# Patient Record
Sex: FEMALE | Race: WHITE | NOT HISPANIC OR LATINO | Employment: PART TIME | URBAN - METROPOLITAN AREA
[De-identification: names, ages, dates, MRNs, and addresses within clinical notes are randomized per-mention and may not be internally consistent; named-entity substitution may affect disease eponyms.]

---

## 2022-02-22 ENCOUNTER — HISTORICAL (OUTPATIENT)
Dept: ADMINISTRATIVE | Facility: HOSPITAL | Age: 27
End: 2022-02-22

## 2022-05-26 ENCOUNTER — HOSPITAL ENCOUNTER (EMERGENCY)
Facility: HOSPITAL | Age: 27
Discharge: HOME | End: 2022-05-26
Attending: STUDENT IN AN ORGANIZED HEALTH CARE EDUCATION/TRAINING PROGRAM
Payer: MEDICAID

## 2022-05-26 VITALS
HEART RATE: 96 BPM | RESPIRATION RATE: 18 BRPM | SYSTOLIC BLOOD PRESSURE: 131 MMHG | DIASTOLIC BLOOD PRESSURE: 72 MMHG | TEMPERATURE: 99 F | OXYGEN SATURATION: 100 %

## 2022-05-26 DIAGNOSIS — R55 VASOVAGAL SYNCOPE: ICD-10-CM

## 2022-05-26 DIAGNOSIS — N39.0 URINARY TRACT INFECTION WITHOUT HEMATURIA, SITE UNSPECIFIED: ICD-10-CM

## 2022-05-26 DIAGNOSIS — R55 SYNCOPE: ICD-10-CM

## 2022-05-26 DIAGNOSIS — R11.2 NAUSEA AND VOMITING, INTRACTABILITY OF VOMITING NOT SPECIFIED, UNSPECIFIED VOMITING TYPE: Primary | ICD-10-CM

## 2022-05-26 LAB
ALBUMIN SERPL-MCNC: 3.8 GM/DL (ref 3.5–5)
ALBUMIN/GLOB SERPL: 1 RATIO (ref 1.1–2)
ALP SERPL-CCNC: 88 UNIT/L (ref 40–150)
ALT SERPL-CCNC: 38 UNIT/L (ref 0–55)
APPEARANCE UR: ABNORMAL
AST SERPL-CCNC: 27 UNIT/L (ref 5–34)
B-HCG SERPL QL: NEGATIVE
BACTERIA #/AREA URNS AUTO: ABNORMAL /HPF
BASOPHILS # BLD AUTO: 0.03 X10(3)/MCL (ref 0–0.2)
BASOPHILS NFR BLD AUTO: 0.2 %
BILIRUB UR QL STRIP.AUTO: NEGATIVE MG/DL
BILIRUBIN DIRECT+TOT PNL SERPL-MCNC: 1 MG/DL
BNP BLD-MCNC: <10 PG/ML
BUN SERPL-MCNC: 9.4 MG/DL (ref 7–18.7)
CALCIUM SERPL-MCNC: 9.6 MG/DL (ref 8.4–10.2)
CHLORIDE SERPL-SCNC: 102 MMOL/L (ref 98–107)
CO2 SERPL-SCNC: 24 MMOL/L (ref 22–29)
COLOR UR AUTO: ABNORMAL
CREAT SERPL-MCNC: 0.71 MG/DL (ref 0.55–1.02)
EOSINOPHIL # BLD AUTO: 0.14 X10(3)/MCL (ref 0–0.9)
EOSINOPHIL NFR BLD AUTO: 1.1 %
ERYTHROCYTE [DISTWIDTH] IN BLOOD BY AUTOMATED COUNT: 13.2 % (ref 11.5–17)
FLUAV AG UPPER RESP QL IA.RAPID: NOT DETECTED
FLUBV AG UPPER RESP QL IA.RAPID: NOT DETECTED
GLOBULIN SER-MCNC: 4 GM/DL (ref 2.4–3.5)
GLUCOSE SERPL-MCNC: 103 MG/DL (ref 74–100)
GLUCOSE UR QL STRIP.AUTO: NEGATIVE MG/DL
HCT VFR BLD AUTO: 38.2 % (ref 37–47)
HGB BLD-MCNC: 12.3 GM/DL (ref 12–16)
IMM GRANULOCYTES # BLD AUTO: 0.05 X10(3)/MCL (ref 0–0.02)
IMM GRANULOCYTES NFR BLD AUTO: 0.4 % (ref 0–0.43)
KETONES UR QL STRIP.AUTO: ABNORMAL MG/DL
LEUKOCYTE ESTERASE UR QL STRIP.AUTO: ABNORMAL UNIT/L
LIPASE SERPL-CCNC: 11 U/L
LYMPHOCYTES # BLD AUTO: 2.01 X10(3)/MCL (ref 0.6–4.6)
LYMPHOCYTES NFR BLD AUTO: 15.8 %
MCH RBC QN AUTO: 27 PG (ref 27–31)
MCHC RBC AUTO-ENTMCNC: 32.2 MG/DL (ref 33–36)
MCV RBC AUTO: 84 FL (ref 80–94)
MONOCYTES # BLD AUTO: 0.93 X10(3)/MCL (ref 0.1–1.3)
MONOCYTES NFR BLD AUTO: 7.3 %
NEUTROPHILS # BLD AUTO: 9.5 X10(3)/MCL (ref 2.1–9.2)
NEUTROPHILS NFR BLD AUTO: 75.2 %
NITRITE UR QL STRIP.AUTO: NEGATIVE
NRBC BLD AUTO-RTO: 0 %
PH UR STRIP.AUTO: 5.5 [PH]
PLATELET # BLD AUTO: 408 X10(3)/MCL (ref 130–400)
PMV BLD AUTO: 9.4 FL (ref 9.4–12.4)
POTASSIUM SERPL-SCNC: 5 MMOL/L (ref 3.5–5.1)
PROT SERPL-MCNC: 7.8 GM/DL (ref 6.4–8.3)
PROT UR QL STRIP.AUTO: ABNORMAL MG/DL
RBC # BLD AUTO: 4.55 X10(6)/MCL (ref 4.2–5.4)
RBC #/AREA URNS AUTO: 28.1 /HPF
RBC UR QL AUTO: ABNORMAL UNIT/L
SARS-COV-2 RNA RESP QL NAA+PROBE: NOT DETECTED
SODIUM SERPL-SCNC: 138 MMOL/L (ref 136–145)
SP GR UR STRIP.AUTO: 1.02 (ref 1–1.03)
SQUAMOUS #/AREA URNS AUTO: 6 /LPF
STREP A PCR (OHS): NOT DETECTED
TROPONIN I SERPL-MCNC: <0.01 NG/ML (ref 0–0.04)
UROBILINOGEN UR STRIP-ACNC: 1 MG/DL
WBC # SPEC AUTO: 12.7 X10(3)/MCL (ref 4.5–11.5)
WBC #/AREA URNS AUTO: 311 /HPF

## 2022-05-26 PROCEDURE — 85025 COMPLETE CBC W/AUTO DIFF WBC: CPT | Performed by: STUDENT IN AN ORGANIZED HEALTH CARE EDUCATION/TRAINING PROGRAM

## 2022-05-26 PROCEDURE — 93005 ELECTROCARDIOGRAM TRACING: CPT

## 2022-05-26 PROCEDURE — 84484 ASSAY OF TROPONIN QUANT: CPT | Performed by: STUDENT IN AN ORGANIZED HEALTH CARE EDUCATION/TRAINING PROGRAM

## 2022-05-26 PROCEDURE — 96375 TX/PRO/DX INJ NEW DRUG ADDON: CPT

## 2022-05-26 PROCEDURE — 96374 THER/PROPH/DIAG INJ IV PUSH: CPT

## 2022-05-26 PROCEDURE — 99285 EMERGENCY DEPT VISIT HI MDM: CPT | Mod: 25

## 2022-05-26 PROCEDURE — 25000003 PHARM REV CODE 250: Performed by: STUDENT IN AN ORGANIZED HEALTH CARE EDUCATION/TRAINING PROGRAM

## 2022-05-26 PROCEDURE — 81001 URINALYSIS AUTO W/SCOPE: CPT | Performed by: STUDENT IN AN ORGANIZED HEALTH CARE EDUCATION/TRAINING PROGRAM

## 2022-05-26 PROCEDURE — 83880 ASSAY OF NATRIURETIC PEPTIDE: CPT | Performed by: STUDENT IN AN ORGANIZED HEALTH CARE EDUCATION/TRAINING PROGRAM

## 2022-05-26 PROCEDURE — 36415 COLL VENOUS BLD VENIPUNCTURE: CPT | Performed by: STUDENT IN AN ORGANIZED HEALTH CARE EDUCATION/TRAINING PROGRAM

## 2022-05-26 PROCEDURE — 83690 ASSAY OF LIPASE: CPT | Performed by: STUDENT IN AN ORGANIZED HEALTH CARE EDUCATION/TRAINING PROGRAM

## 2022-05-26 PROCEDURE — 81025 URINE PREGNANCY TEST: CPT | Performed by: STUDENT IN AN ORGANIZED HEALTH CARE EDUCATION/TRAINING PROGRAM

## 2022-05-26 PROCEDURE — 63600175 PHARM REV CODE 636 W HCPCS: Performed by: STUDENT IN AN ORGANIZED HEALTH CARE EDUCATION/TRAINING PROGRAM

## 2022-05-26 PROCEDURE — 80053 COMPREHEN METABOLIC PANEL: CPT | Performed by: STUDENT IN AN ORGANIZED HEALTH CARE EDUCATION/TRAINING PROGRAM

## 2022-05-26 PROCEDURE — 87631 RESP VIRUS 3-5 TARGETS: CPT | Performed by: STUDENT IN AN ORGANIZED HEALTH CARE EDUCATION/TRAINING PROGRAM

## 2022-05-26 PROCEDURE — 87636 SARSCOV2 & INF A&B AMP PRB: CPT | Performed by: STUDENT IN AN ORGANIZED HEALTH CARE EDUCATION/TRAINING PROGRAM

## 2022-05-26 RX ORDER — BUTALBITAL, ACETAMINOPHEN AND CAFFEINE 50; 325; 40 MG/1; MG/1; MG/1
1 TABLET ORAL EVERY 6 HOURS PRN
Qty: 15 TABLET | Refills: 0 | Status: SHIPPED | OUTPATIENT
Start: 2022-05-26 | End: 2022-05-31

## 2022-05-26 RX ORDER — KETOROLAC TROMETHAMINE 30 MG/ML
30 INJECTION, SOLUTION INTRAMUSCULAR; INTRAVENOUS
Status: COMPLETED | OUTPATIENT
Start: 2022-05-26 | End: 2022-05-26

## 2022-05-26 RX ORDER — ONDANSETRON 2 MG/ML
4 INJECTION INTRAMUSCULAR; INTRAVENOUS
Status: COMPLETED | OUTPATIENT
Start: 2022-05-26 | End: 2022-05-26

## 2022-05-26 RX ORDER — IBUPROFEN 400 MG/1
400 TABLET ORAL EVERY 6 HOURS PRN
Qty: 15 TABLET | Refills: 0 | Status: SHIPPED | OUTPATIENT
Start: 2022-05-26 | End: 2022-05-31

## 2022-05-26 RX ORDER — CEPHALEXIN 500 MG/1
500 CAPSULE ORAL EVERY 12 HOURS
Qty: 10 CAPSULE | Refills: 0 | Status: SHIPPED | OUTPATIENT
Start: 2022-05-26 | End: 2022-05-31

## 2022-05-26 RX ORDER — ACETAMINOPHEN 500 MG
1000 TABLET ORAL
Status: COMPLETED | OUTPATIENT
Start: 2022-05-26 | End: 2022-05-26

## 2022-05-26 RX ORDER — ONDANSETRON 4 MG/1
4 TABLET, ORALLY DISINTEGRATING ORAL EVERY 8 HOURS PRN
Qty: 15 TABLET | Refills: 0 | Status: SHIPPED | OUTPATIENT
Start: 2022-05-26 | End: 2022-05-31

## 2022-05-26 RX ADMIN — ONDANSETRON 4 MG: 2 INJECTION INTRAMUSCULAR; INTRAVENOUS at 08:05

## 2022-05-26 RX ADMIN — ACETAMINOPHEN 1000 MG: 500 TABLET ORAL at 10:05

## 2022-05-26 RX ADMIN — KETOROLAC TROMETHAMINE 30 MG: 30 INJECTION, SOLUTION INTRAMUSCULAR; INTRAVENOUS at 10:05

## 2022-05-26 RX ADMIN — SODIUM CHLORIDE, POTASSIUM CHLORIDE, SODIUM LACTATE AND CALCIUM CHLORIDE 1000 ML: 600; 310; 30; 20 INJECTION, SOLUTION INTRAVENOUS at 08:05

## 2022-05-26 NOTE — DISCHARGE INSTRUCTIONS
Follow-up with primary care provider.    You may take Zofran dissolvable tablet as needed for nausea or vomiting.    Return to the emergency room if you have any worsening pain, fever, difficulty breathing, headache, or any new symptoms.

## 2022-05-26 NOTE — ED PROVIDER NOTES
Encounter Date: 5/26/2022    SCRIBE #1 NOTE: I, Michelle Perkins, am scribing for, and in the presence of,  Bertrand Cortes MD. I have scribed the following portions of the note - Other sections scribed: HPI, ROS, PE.       History     Chief Complaint   Patient presents with    Sore Throat     Onset Monday. Went to [facility] and was dx with dehydration. Reports fall this AM, hits head, +LOC, not on BT. PMH collapsed vein to left leg    Dizziness    Fall     26 y/o female with hx of anxiety and depression presents to the ED complaining of a sore throat for the past 3 days. Associated symptoms include nausea and vomiting. The pt states she woke up this morning at 0100 choking on her vomit and reports she passed out in her bathroom. She states she hit the back of her head on the sink when she lost consciousness. Denies any sick contacts. The pt notes she has a stent in her left leg and has a hx of cholecystectomy.    The history is provided by the patient.   Sore Throat   This is a new problem. The sore throat symptoms include sore throat.Illness onset: 3 days ago. The problem has been gradually improving. Associated symptoms include vomiting. Pertinent negatives include no abdominal pain or shortness of breath.   Dizziness  Pertinent negatives include no chest pain, no abdominal pain and no shortness of breath.   Fall  Associated symptoms include nausea and vomiting. Pertinent negatives include no fever and no abdominal pain.     Review of patient's allergies indicates:  No Known Allergies  History reviewed. No pertinent past medical history.  History reviewed. No pertinent surgical history.  History reviewed. No pertinent family history.     Review of Systems   Constitutional: Negative for fever.   HENT: Positive for sore throat.    Eyes: Negative for visual disturbance.   Respiratory: Negative for shortness of breath.    Cardiovascular: Negative for chest pain.   Gastrointestinal: Positive for nausea and vomiting. Negative  for abdominal pain.   Genitourinary: Negative for dysuria.   Musculoskeletal: Negative for joint swelling.   Skin: Negative for rash.   Neurological: Positive for dizziness. Negative for weakness.        Loss of consciousness   Psychiatric/Behavioral: Negative for confusion.   All other systems reviewed and are negative.      Physical Exam     Initial Vitals [05/26/22 0759]   BP Pulse Resp Temp SpO2   123/78 99 18 99.3 °F (37.4 °C) 97 %      MAP       --         Physical Exam    Nursing note and vitals reviewed.  Constitutional: She appears well-developed and well-nourished.   HENT:   Head: Normocephalic and atraumatic.   Right Ear: External ear normal.   Left Ear: External ear normal.   Nose: Nose normal.   Mouth/Throat: Oropharynx is clear and moist. No oropharyngeal exudate.   No abrasions, contusions, lacerations to the scalp or face.  No superior inferior orbital ridge tenderness to palpation.  No zygomatic arch tenderness to palpation.  No epistaxis.  No CSF rhinorrhea.  No septal hematoma.  No intraoral injuries noted.  Normal external ear.  No raccoon eyes.  No Wright sign.       Eyes: EOM are normal. Pupils are equal, round, and reactive to light.   Neck:   Normal range of motion.  Cardiovascular: Normal rate, regular rhythm, normal heart sounds and intact distal pulses.   No murmur heard.  Pulmonary/Chest: Breath sounds normal. No respiratory distress. She has no wheezes. She has no rales.   Abdominal: Abdomen is soft. She exhibits no distension. There is no abdominal tenderness. There is no rebound.   Musculoskeletal:         General: No tenderness or edema. Normal range of motion.      Cervical back: Normal range of motion.      Comments: No C,T or L-spine vertebral point tenderness to palpation, no step-offs, no deformities.  Right upper extremity:  Full range of motion of shoulder, elbow, wrist, no deformity or tenderness to palpation.  Left upper extremity: Full range of motion of shoulder, elbow,  wrist, no deformity or tenderness to palpation.  Right lower extremity:  Full range of motion of hip, knee, ankle, no tenderness palpation or deformity noted.  Left lower extremity:  Full range of motion of hip, knee, ankle, no tenderness palpation or deformity noted.       Neurological: She is alert. She has normal strength. No cranial nerve deficit. GCS score is 15. GCS eye subscore is 4. GCS verbal subscore is 5. GCS motor subscore is 6.   Skin: Skin is warm and dry. Capillary refill takes less than 2 seconds. No rash noted. No erythema.   No abrasions, contusions or lacerations to head or face   Psychiatric: She has a normal mood and affect.         ED Course   Procedures  Labs Reviewed   COMPREHENSIVE METABOLIC PANEL - Abnormal; Notable for the following components:       Result Value    Glucose Level 103 (*)     Globulin 4.0 (*)     Albumin/Globulin Ratio 1.0 (*)     All other components within normal limits   URINALYSIS, REFLEX TO URINE CULTURE - Abnormal; Notable for the following components:    Color, UA Dark Yellow (*)     Appearance, UA Cloudy (*)     Protein, UA 2+ (*)     Ketones, UA 1+ (*)     Blood, UA 2+ (*)     Leukocyte Esterase, UA 3+ (*)     All other components within normal limits   CBC WITH DIFFERENTIAL - Abnormal; Notable for the following components:    WBC 12.7 (*)     MCHC 32.2 (*)     Platelet 408 (*)     Neut # 9.5 (*)     IG# 0.05 (*)     All other components within normal limits   URINALYSIS, MICROSCOPIC - Abnormal; Notable for the following components:    RBC, UA 28.1 (*)     WBC,  (*)     Squamous Epithelial Cells, UA 6 (*)     Bacteria, UA Few (*)     All other components within normal limits   COVID/FLU A&B PCR - Normal   B-TYPE NATRIURETIC PEPTIDE - Normal   TROPONIN I - Normal   PREGNANCY TEST, URINE RAPID - Normal   LIPASE - Normal   STREP GROUP A BY PCR - Normal   CULTURE, URINE   CBC W/ AUTO DIFFERENTIAL    Narrative:     The following orders were created for panel order  CBC auto differential.  Procedure                               Abnormality         Status                     ---------                               -----------         ------                     CBC with Differential[[id number]]        Abnormal            Final result                 Please view results for these tests on the individual orders.     EKG Readings: (Independently Interpreted)   Initial Reading: No STEMI. Rhythm: Normal Sinus Rhythm. Heart Rate: 94. Ectopy: No Ectopy.   Normal intervals.      ECG Results          EKG 12-lead (Final result)  Result time 05/26/22 13:13:34    Final result by Interface, Lab In [facility] (05/26/22 13:13:34)                 Narrative:    Test Reason : R55,    Vent. Rate : 094 BPM     Atrial Rate : 094 BPM     P-R Int : 152 ms          QRS Dur : 082 ms      QT Int : 326 ms       P-R-T Axes : 019 045 -11 degrees     QTc Int : 407 ms    Normal sinus rhythm  Normal ECG  Confirmed by Michelle Solares MD ([id number]) on 5/26/2022 1:13:27 PM    Referred By: [email or web address]   SELF           Confirmed By:Michelle Solares MD                             EKG 12-LEAD (Final result)  Result time 06/03/22 15:47:00    Final result by Unknown User (06/03/22 15:47:00)                                Imaging Results          CT Head Without Contrast (Final result)  Result time 05/26/22 09:59:37    Final result by Ellie Ramos MD (05/26/22 09:59:37)                 Impression:      No acute intracranial abnormality.      Electronically signed by: Ellie Ramos  Date:    05/26/2022  Time:    09:59             Narrative:    EXAMINATION:  CT HEAD WITHOUT CONTRAST    CLINICAL HISTORY:  Syncope, recurrent;    TECHNIQUE:  Axial scans were obtained from skull base to the vertex.    Coronal and sagittal reconstructions obtained from the axial data.    Automatic exposure control was utilized to limit radiation dose.    Contrast: None    Radiation Dose:    Total DLP: 1067  mGy*cm    COMPARISON:  None    FINDINGS:  There is no acute intracranial hemorrhage or edema. The gray-white matter differentiation is preserved.    There is no mass effect or midline shift. The ventricles and sulci are normal in size. The basal cisterns are patent. There is no abnormal extra-axial fluid collection.    The calvarium and skull base are intact.  There is opacification of the right maxillary sinus.  The mastoid air cells are clear.                                 Medications   lactated ringers bolus 1,000 mL (1,000 mLs Intravenous New Bag 5/26/22 0815)   ondansetron injection 4 mg (4 mg Intravenous Given 5/26/22 0815)   ketorolac injection 30 mg (30 mg Intravenous Given 5/26/22 1000)   acetaminophen tablet 1,000 mg (1,000 mg Oral Given 5/26/22 1000)               Patient is a 27-year-old female presents to the emergency department for viral URI symptoms, reports fit of coughing and then having a syncopal episode.  EKG without any acute abnormality.  Patient states she did hit her head.  CT of the head obtained without any acute abnormality.  Urinalysis without evidence of infection.  Given antibiotics.  Given IV fluids.  Nausea control. Reassessed patient.  Patient is resting comfortably.  Discussed all results.  Discussed need for follow-up.  Discussed return precautions.  Answered all questions at this time.  Hemodynamically stable for continued outpatient management with strict return precautions.  Patient verbalized understanding agreed to plan.          Scribe Attestation:   Scribe #1: I performed the above scribed service and the documentation accurately describes the services I performed. I attest to the accuracy of the note.                 I, Bertrand Cortes MD, personally performed the services described in the documentation as documented by the scribe in my presence and is both accurate and complete.     Clinical Impression:   Final diagnoses:  [R55] Syncope  [R11.2] Nausea and vomiting,  intractability of vomiting not specified, unspecified vomiting type (Primary)  [R55] Vasovagal syncope  [N39.0] Urinary tract infection without hematuria, site unspecified          ED Disposition Condition    Discharge Stable        ED Prescriptions     Medication Sig Dispense Start Date End Date Auth. Provider    ondansetron (ZOFRAN-ODT) 4 MG TbDL () Take 1 tablet (4 mg total) by mouth every 8 (eight) hours as needed (nausea/vomiting). 15 tablet 2022 Bertrand Cortes MD    ibuprofen (ADVIL,MOTRIN) 400 MG tablet () Take 1 tablet (400 mg total) by mouth every 6 (six) hours as needed for Other (pain). 15 tablet 2022 Bertrand Cortes MD    butalbital-acetaminophen-caffeine -40 mg (FIORICET, ESGIC) -40 mg per tablet () Take 1 tablet by mouth every 6 (six) hours as needed for Headaches. 15 tablet 2022 Bertrand Cortes MD    cephALEXin (KEFLEX) 500 MG capsule () Take 1 capsule (500 mg total) by mouth every 12 (twelve) hours. for 5 days 10 capsule 2022 Bertrand Cortes MD        Follow-up Information     Follow up With Specialties Details Why Contact Info    Danielle Watts MD Family Medicine Schedule an appointment as soon as possible for a visit in 1 day  [address] [facility] [address]  [phone]             Bertrand Cortes MD  [phone]

## 2022-05-26 NOTE — Clinical Note
"Latonya Chongney" Mor was seen and treated in our emergency department on 5/26/2022.  She may return to work on 05/30/2022.       If you have any questions or concerns, please don't hesitate to call.      Bertrand Cortes MD"

## 2022-05-28 LAB — BACTERIA UR CULT: NO GROWTH

## 2023-08-06 ENCOUNTER — HOSPITAL ENCOUNTER (EMERGENCY)
Facility: HOSPITAL | Age: 28
Discharge: HOME | End: 2023-08-06
Attending: GENERAL ACUTE CARE HOSPITAL
Payer: MEDICAID

## 2023-08-06 VITALS
DIASTOLIC BLOOD PRESSURE: 74 MMHG | TEMPERATURE: 98 F | RESPIRATION RATE: 18 BRPM | WEIGHT: 277.38 LBS | HEART RATE: 82 BPM | BODY MASS INDEX: 43.54 KG/M2 | SYSTOLIC BLOOD PRESSURE: 148 MMHG | OXYGEN SATURATION: 100 % | HEIGHT: 67 IN

## 2023-08-06 DIAGNOSIS — R07.89 ATYPICAL CHEST PAIN: Primary | ICD-10-CM

## 2023-08-06 DIAGNOSIS — R07.9 CHEST PAIN: ICD-10-CM

## 2023-08-06 DIAGNOSIS — M54.2 CERVICALGIA: ICD-10-CM

## 2023-08-06 LAB
ALBUMIN SERPL-MCNC: 3.7 G/DL (ref 3.5–5)
ALBUMIN/GLOB SERPL: 1.1 RATIO (ref 1.1–2)
ALP SERPL-CCNC: 66 UNIT/L (ref 40–150)
ALT SERPL-CCNC: 19 UNIT/L (ref 0–55)
APPEARANCE UR: CLEAR
AST SERPL-CCNC: 13 UNIT/L (ref 5–34)
B-HCG SERPL QL: NEGATIVE
BACTERIA #/AREA URNS AUTO: ABNORMAL /HPF
BASOPHILS # BLD AUTO: 0.04 X10(3)/MCL
BASOPHILS NFR BLD AUTO: 0.3 %
BILIRUB UR QL STRIP.AUTO: NEGATIVE
BILIRUBIN DIRECT+TOT PNL SERPL-MCNC: 0.5 MG/DL
BUN SERPL-MCNC: 9 MG/DL (ref 7–18.7)
CALCIUM SERPL-MCNC: 9.3 MG/DL (ref 8.4–10.2)
CHLORIDE SERPL-SCNC: 102 MMOL/L (ref 98–107)
CK SERPL-CCNC: 70 U/L (ref 29–168)
CO2 SERPL-SCNC: 27 MMOL/L (ref 22–29)
COLOR UR: YELLOW
CREAT SERPL-MCNC: 0.61 MG/DL (ref 0.55–1.02)
EOSINOPHIL # BLD AUTO: 0.11 X10(3)/MCL (ref 0–0.9)
EOSINOPHIL NFR BLD AUTO: 0.7 %
ERYTHROCYTE [DISTWIDTH] IN BLOOD BY AUTOMATED COUNT: 13.2 % (ref 11.5–17)
GFR SERPLBLD CREATININE-BSD FMLA CKD-EPI: >60 MLS/MIN/1.73/M2
GLOBULIN SER-MCNC: 3.5 GM/DL (ref 2.4–3.5)
GLUCOSE SERPL-MCNC: 103 MG/DL (ref 74–100)
GLUCOSE UR QL STRIP.AUTO: NEGATIVE
HCT VFR BLD AUTO: 37.4 % (ref 37–47)
HGB BLD-MCNC: 11.8 G/DL (ref 12–16)
IMM GRANULOCYTES # BLD AUTO: 0.06 X10(3)/MCL (ref 0–0.04)
IMM GRANULOCYTES NFR BLD AUTO: 0.4 %
KETONES UR QL STRIP.AUTO: NEGATIVE
LEUKOCYTE ESTERASE UR QL STRIP.AUTO: NEGATIVE
LYMPHOCYTES # BLD AUTO: 3.16 X10(3)/MCL (ref 0.6–4.6)
LYMPHOCYTES NFR BLD AUTO: 21.2 %
MAGNESIUM SERPL-MCNC: 1.8 MG/DL (ref 1.6–2.6)
MCH RBC QN AUTO: 26.8 PG (ref 27–31)
MCHC RBC AUTO-ENTMCNC: 31.6 G/DL (ref 33–36)
MCV RBC AUTO: 84.8 FL (ref 80–94)
MONOCYTES # BLD AUTO: 0.86 X10(3)/MCL (ref 0.1–1.3)
MONOCYTES NFR BLD AUTO: 5.8 %
NEUTROPHILS # BLD AUTO: 10.66 X10(3)/MCL (ref 2.1–9.2)
NEUTROPHILS NFR BLD AUTO: 71.6 %
NITRITE UR QL STRIP.AUTO: NEGATIVE
PH UR STRIP.AUTO: 7.5 [PH]
PLATELET # BLD AUTO: 373 X10(3)/MCL (ref 130–400)
PMV BLD AUTO: 9.6 FL (ref 7.4–10.4)
POTASSIUM SERPL-SCNC: 4 MMOL/L (ref 3.5–5.1)
PROT SERPL-MCNC: 7.2 GM/DL (ref 6.4–8.3)
PROT UR QL STRIP.AUTO: NEGATIVE
RBC # BLD AUTO: 4.41 X10(6)/MCL (ref 4.2–5.4)
RBC #/AREA URNS AUTO: ABNORMAL /HPF
RBC UR QL AUTO: ABNORMAL
SODIUM SERPL-SCNC: 139 MMOL/L (ref 136–145)
SP GR UR STRIP.AUTO: 1.02
SQUAMOUS #/AREA URNS AUTO: ABNORMAL /HPF
TROPONIN I SERPL-MCNC: <0.01 NG/ML (ref 0–0.04)
TSH SERPL-ACNC: 0.88 UIU/ML (ref 0.35–4.94)
UROBILINOGEN UR STRIP-ACNC: 1
WBC # SPEC AUTO: 14.89 X10(3)/MCL (ref 4.5–11.5)
WBC #/AREA URNS AUTO: ABNORMAL /HPF

## 2023-08-06 PROCEDURE — 93010 ELECTROCARDIOGRAM REPORT: CPT | Mod: ,,, | Performed by: STUDENT IN AN ORGANIZED HEALTH CARE EDUCATION/TRAINING PROGRAM

## 2023-08-06 PROCEDURE — 81025 URINE PREGNANCY TEST: CPT | Performed by: PHYSICIAN ASSISTANT

## 2023-08-06 PROCEDURE — 84443 ASSAY THYROID STIM HORMONE: CPT | Performed by: PHYSICIAN ASSISTANT

## 2023-08-06 PROCEDURE — 85025 COMPLETE CBC W/AUTO DIFF WBC: CPT | Performed by: PHYSICIAN ASSISTANT

## 2023-08-06 PROCEDURE — 80053 COMPREHEN METABOLIC PANEL: CPT | Performed by: PHYSICIAN ASSISTANT

## 2023-08-06 PROCEDURE — 25000003 PHARM REV CODE 250: Performed by: GENERAL ACUTE CARE HOSPITAL

## 2023-08-06 PROCEDURE — 82550 ASSAY OF CK (CPK): CPT | Performed by: PHYSICIAN ASSISTANT

## 2023-08-06 PROCEDURE — 81001 URINALYSIS AUTO W/SCOPE: CPT | Performed by: PHYSICIAN ASSISTANT

## 2023-08-06 PROCEDURE — 93010 EKG 12-LEAD: ICD-10-PCS | Mod: ,,, | Performed by: STUDENT IN AN ORGANIZED HEALTH CARE EDUCATION/TRAINING PROGRAM

## 2023-08-06 PROCEDURE — 84484 ASSAY OF TROPONIN QUANT: CPT | Performed by: PHYSICIAN ASSISTANT

## 2023-08-06 PROCEDURE — 93005 ELECTROCARDIOGRAM TRACING: CPT

## 2023-08-06 PROCEDURE — 99285 EMERGENCY DEPT VISIT HI MDM: CPT | Mod: 25

## 2023-08-06 PROCEDURE — 83735 ASSAY OF MAGNESIUM: CPT | Performed by: PHYSICIAN ASSISTANT

## 2023-08-06 RX ORDER — IBUPROFEN 600 MG/1
600 TABLET ORAL EVERY 6 HOURS PRN
Qty: 20 TABLET | Refills: 0 | Status: SHIPPED | OUTPATIENT
Start: 2023-08-06 | End: 2024-02-21 | Stop reason: CLARIF

## 2023-08-06 RX ORDER — IBUPROFEN 400 MG/1
TABLET ORAL
Status: DISCONTINUED
Start: 2023-08-06 | End: 2023-08-06 | Stop reason: HOSPADM

## 2023-08-06 RX ORDER — IBUPROFEN 400 MG/1
800 TABLET ORAL
Status: COMPLETED | OUTPATIENT
Start: 2023-08-06 | End: 2023-08-06

## 2023-08-06 RX ADMIN — IBUPROFEN 800 MG: 400 TABLET, FILM COATED ORAL at 08:08

## 2023-08-06 NOTE — FIRST PROVIDER EVALUATION
"Medical screening examination initiated.  I have conducted a focused provider triage encounter, findings are as follows:    Brief history of present illness:  28-year-old female presents to ED for evaluation of chest pain since Friday.  States that today she started having some numbness and tingling going down her left arm.  Denies any cardiac history.    Vitals:    08/06/23 1834   BP: (!) 153/79   Pulse: 88   Resp: 20   Temp: 98 °F (36.7 °C)   TempSrc: Tympanic   SpO2: 100%   Weight: 125.8 kg (277 lb 6.4 oz)   Height: 5' 7" (1.702 m)       Pertinent physical exam:  Patient is awake and alert and oriented.  Ambulatory to triage.  In no acute distress.      Brief workup plan:  labs, EKG, CXR    Preliminary workup initiated; this workup will be continued and followed by the physician or advanced practice provider that is assigned to the patient when roomed.  "

## 2023-08-07 NOTE — DISCHARGE INSTRUCTIONS
Follow-up with PCP in 2-3 days for reexamination re-evaluation, requires repeat CBC (patient has elevated leukocytes), use OTC muscle creams for neck pain

## 2023-08-07 NOTE — ED PROVIDER NOTES
Encounter Date: 2023       History     Chief Complaint   Patient presents with    Chest Pain     Chest pain since Friday. Pain to R arm today and c/o R hand numbness today also.     Patient came in emergency room for chest pain for 3 days, complains of neck pain radiating to right lower extremity with paresthesia, patient has history of pseudo seizure, had full cardiac workup done 1 year ago (stress test was negative), patient had history of anxiety/panic attacks ,no birth control    The history is provided by the patient.     Review of patient's allergies indicates:  No Known Allergies  History reviewed. No pertinent past medical history.  Past Surgical History:   Procedure Laterality Date     SECTION      CHOLECYSTECTOMY      PLACEMENT-STENT       History reviewed. No pertinent family history.  Social History     Tobacco Use    Smoking status: Never    Smokeless tobacco: Never   Substance Use Topics    Alcohol use: Yes    Drug use: Never     Review of Systems   Cardiovascular:  Positive for chest pain.   Musculoskeletal:  Positive for neck pain.   Neurological:  Positive for numbness.   All other systems reviewed and are negative.      Physical Exam     Initial Vitals [23 1834]   BP Pulse Resp Temp SpO2   (!) 153/79 88 20 98 °F (36.7 °C) 100 %      MAP       --         Physical Exam    Nursing note and vitals reviewed.  Constitutional: She appears well-developed and well-nourished.   HENT:   Right Ear: External ear normal.   Left Ear: External ear normal.   Eyes: Conjunctivae and EOM are normal. Pupils are equal, round, and reactive to light.   Neck: Neck supple.       Normal range of motion.  Cardiovascular:  Normal rate, regular rhythm and normal heart sounds.           Pulmonary/Chest: Breath sounds normal.   Abdominal: Abdomen is soft. Bowel sounds are normal.   Musculoskeletal:         General: Normal range of motion.      Cervical back: Normal range of motion and neck supple. Muscular  tenderness present.     Neurological: She is alert. She has normal reflexes.   Skin: Skin is warm. Capillary refill takes 2 to 3 seconds.   Psychiatric: Her behavior is normal. Thought content normal.         ED Course   Procedures  Labs Reviewed   COMPREHENSIVE METABOLIC PANEL - Abnormal; Notable for the following components:       Result Value    Glucose Level 103 (*)     All other components within normal limits   URINALYSIS, REFLEX TO URINE CULTURE - Abnormal; Notable for the following components:    Blood, UA Trace-Intact (*)     All other components within normal limits   CBC WITH DIFFERENTIAL - Abnormal; Notable for the following components:    WBC 14.89 (*)     Hgb 11.8 (*)     MCH 26.8 (*)     MCHC 31.6 (*)     Neut # 10.66 (*)     IG# 0.06 (*)     All other components within normal limits   URINALYSIS, MICROSCOPIC - Abnormal; Notable for the following components:    Squamous Epithelial Cells, UA Few (*)     All other components within normal limits   MAGNESIUM - Normal   TROPONIN I - Normal   TSH - Normal   PREGNANCY TEST, URINE RAPID - Normal   CK - Normal   CBC W/ AUTO DIFFERENTIAL    Narrative:     The following orders were created for panel order CBC auto differential.  Procedure                               Abnormality         Status                     ---------                               -----------         ------                     CBC with Differential[[id number]]        Abnormal            Final result                 Please view results for these tests on the individual orders.     EKG Readings: (Independently Interpreted)   Initial Reading: No STEMI. Rhythm: Sinus Arrhythmia. Heart Rate: 83. ST Segments: Normal ST Segments. T Waves: Normal. Axis: Normal. Q Waves: III. Clinical Impression: Normal Sinus Rhythm       Imaging Results              X-Ray Chest PA And Lateral (In process)                      Medications   ibuprofen tablet 800 mg (has no administration in time range)     Medical  Decision Making:   Initial Assessment:   Patient came in emergency room for chest pain for 3 days, complains of neck pain radiating to right lower extremity with paresthesia, patient has history of pseudo seizure, had full cardiac workup done 1 year ago (stress test was negative), patient had history of anxiety/panic attacks ,no birth control, physical exam revealed no pain distress in the face, patient is overweight at, right cervical muscle tenderness, but cervical AROM are preserved, normal lung heart exam  Differential Diagnosis:   Panic attack, CAD, Brian-Parkinson-White, Brugada syndrome, atypical chest pain  Clinical Tests:   Lab Tests: Ordered and Reviewed  ED Management:  EKG reveals sinus arrhythmia             ED Course as of 08/06/23 2023   Sun Aug 06, 2023   2022 Patient's labs returned negative except mildly elevated WBC 14.8 [IP]      ED Course User Index  [IP] Margarita Souza MD                 Clinical Impression:   Final diagnoses:  [R07.9] Chest pain  [R07.89] Atypical chest pain (Primary)  [M54.2] Cervicalgia        ED Disposition Condition    Discharge Stable          ED Prescriptions       Medication Sig Dispense Start Date End Date Auth. Provider    ibuprofen (ADVIL,MOTRIN) 600 MG tablet Take 1 tablet (600 mg total) by mouth every 6 (six) hours as needed for Pain. 20 tablet 8/6/2023 -- Margarita Souza MD          Follow-up Information       Follow up With Specialties Details Why Contact Info    Danielle aWtts MD Family Medicine In 3 days  [address] [facility] [address]  [phone]               Margarita Souza MD  08/06/23 2023

## 2024-02-09 ENCOUNTER — LAB VISIT (OUTPATIENT)
Dept: LAB | Facility: HOSPITAL | Age: 29
End: 2024-02-09
Attending: OBSTETRICS & GYNECOLOGY
Payer: MEDICAID

## 2024-02-09 DIAGNOSIS — Z34.80 PRENATAL CARE, SUBSEQUENT PREGNANCY: Primary | ICD-10-CM

## 2024-02-09 LAB
ERYTHROCYTE [DISTWIDTH] IN BLOOD BY AUTOMATED COUNT: 13.6 % (ref 11.5–17)
GROUP & RH: NORMAL
HCT VFR BLD AUTO: 36.4 % (ref 37–47)
HGB BLD-MCNC: 11.3 G/DL (ref 12–16)
HIV 1+2 AB+HIV1 P24 AG SERPL QL IA: NONREACTIVE
INDIRECT COOMBS: NORMAL
MCH RBC QN AUTO: 26.5 PG (ref 27–31)
MCHC RBC AUTO-ENTMCNC: 31 G/DL (ref 33–36)
MCV RBC AUTO: 85.4 FL (ref 80–94)
NRBC BLD AUTO-RTO: 0 %
PLATELET # BLD AUTO: 331 X10(3)/MCL (ref 130–400)
PMV BLD AUTO: 9.8 FL (ref 7.4–10.4)
RBC # BLD AUTO: 4.26 X10(6)/MCL (ref 4.2–5.4)
SPECIMEN OUTDATE: NORMAL
T PALLIDUM AB SER QL: NONREACTIVE
TSH SERPL-ACNC: 0.54 UIU/ML (ref 0.35–4.94)
WBC # SPEC AUTO: 10.37 X10(3)/MCL (ref 4.5–11.5)

## 2024-02-09 PROCEDURE — 85027 COMPLETE CBC AUTOMATED: CPT

## 2024-02-09 PROCEDURE — 36415 COLL VENOUS BLD VENIPUNCTURE: CPT

## 2024-02-09 PROCEDURE — 87086 URINE CULTURE/COLONY COUNT: CPT

## 2024-02-09 PROCEDURE — 85660 RBC SICKLE CELL TEST: CPT

## 2024-02-09 PROCEDURE — 86803 HEPATITIS C AB TEST: CPT

## 2024-02-09 PROCEDURE — 87389 HIV-1 AG W/HIV-1&-2 AB AG IA: CPT

## 2024-02-09 PROCEDURE — 86762 RUBELLA ANTIBODY: CPT

## 2024-02-09 PROCEDURE — 86780 TREPONEMA PALLIDUM: CPT

## 2024-02-09 PROCEDURE — 84443 ASSAY THYROID STIM HORMONE: CPT

## 2024-02-09 PROCEDURE — 87340 HEPATITIS B SURFACE AG IA: CPT

## 2024-02-09 PROCEDURE — 86901 BLOOD TYPING SEROLOGIC RH(D): CPT | Performed by: OBSTETRICS & GYNECOLOGY

## 2024-02-10 LAB
HBV SURFACE AG SERPL QL IA: NONREACTIVE
HCV AB SERPL QL IA: NONREACTIVE
RUBV IGG SERPL IA-ACNC: 0.3
RUBV IGG SERPL QL IA: NEGATIVE

## 2024-02-11 LAB
BACTERIA UR CULT: NORMAL
HGB S BLD QL SOLY: NEGATIVE

## 2024-02-21 ENCOUNTER — HOSPITAL ENCOUNTER (EMERGENCY)
Facility: HOSPITAL | Age: 29
Discharge: HOME | End: 2024-02-22
Attending: STUDENT IN AN ORGANIZED HEALTH CARE EDUCATION/TRAINING PROGRAM
Payer: MEDICAID

## 2024-02-21 DIAGNOSIS — R10.11 RUQ PAIN: ICD-10-CM

## 2024-02-21 DIAGNOSIS — R42 DIZZINESS: ICD-10-CM

## 2024-02-21 DIAGNOSIS — R10.9 ABDOMINAL PAIN DURING PREGNANCY IN SECOND TRIMESTER: Primary | ICD-10-CM

## 2024-02-21 DIAGNOSIS — O23.42 URINARY TRACT INFECTION IN MOTHER DURING SECOND TRIMESTER OF PREGNANCY: ICD-10-CM

## 2024-02-21 DIAGNOSIS — O26.892 ABDOMINAL PAIN DURING PREGNANCY IN SECOND TRIMESTER: Primary | ICD-10-CM

## 2024-02-21 LAB
ALBUMIN SERPL-MCNC: 3.3 G/DL (ref 3.5–5)
ALBUMIN/GLOB SERPL: 0.9 RATIO (ref 1.1–2)
ALP SERPL-CCNC: 66 UNIT/L (ref 40–150)
ALT SERPL-CCNC: 13 UNIT/L (ref 0–55)
APPEARANCE UR: CLEAR
AST SERPL-CCNC: 13 UNIT/L (ref 5–34)
BACTERIA #/AREA URNS AUTO: ABNORMAL /HPF
BASOPHILS # BLD AUTO: 0.03 X10(3)/MCL
BASOPHILS NFR BLD AUTO: 0.2 %
BILIRUB SERPL-MCNC: 0.5 MG/DL
BILIRUB UR QL STRIP.AUTO: NEGATIVE
BUN SERPL-MCNC: 8.7 MG/DL (ref 7–18.7)
CALCIUM SERPL-MCNC: 9.5 MG/DL (ref 8.4–10.2)
CHLORIDE SERPL-SCNC: 103 MMOL/L (ref 98–107)
CO2 SERPL-SCNC: 24 MMOL/L (ref 22–29)
COLOR UR AUTO: ABNORMAL
CREAT SERPL-MCNC: 0.6 MG/DL (ref 0.55–1.02)
EOSINOPHIL # BLD AUTO: 0.07 X10(3)/MCL (ref 0–0.9)
EOSINOPHIL NFR BLD AUTO: 0.5 %
ERYTHROCYTE [DISTWIDTH] IN BLOOD BY AUTOMATED COUNT: 13.5 % (ref 11.5–17)
GFR SERPLBLD CREATININE-BSD FMLA CKD-EPI: >60 MLS/MIN/1.73/M2
GLOBULIN SER-MCNC: 3.6 GM/DL (ref 2.4–3.5)
GLUCOSE SERPL-MCNC: 80 MG/DL (ref 74–100)
GLUCOSE UR QL STRIP.AUTO: NORMAL
HCT VFR BLD AUTO: 35.1 % (ref 37–47)
HGB BLD-MCNC: 11.3 G/DL (ref 12–16)
IMM GRANULOCYTES # BLD AUTO: 0.09 X10(3)/MCL (ref 0–0.04)
IMM GRANULOCYTES NFR BLD AUTO: 0.6 %
KETONES UR QL STRIP.AUTO: ABNORMAL
LEUKOCYTE ESTERASE UR QL STRIP.AUTO: 250
LYMPHOCYTES # BLD AUTO: 3.16 X10(3)/MCL (ref 0.6–4.6)
LYMPHOCYTES NFR BLD AUTO: 22 %
MCH RBC QN AUTO: 26.4 PG (ref 27–31)
MCHC RBC AUTO-ENTMCNC: 32.2 G/DL (ref 33–36)
MCV RBC AUTO: 82 FL (ref 80–94)
MONOCYTES # BLD AUTO: 0.83 X10(3)/MCL (ref 0.1–1.3)
MONOCYTES NFR BLD AUTO: 5.8 %
MUCOUS THREADS URNS QL MICRO: ABNORMAL /LPF
NEUTROPHILS # BLD AUTO: 10.2 X10(3)/MCL (ref 2.1–9.2)
NEUTROPHILS NFR BLD AUTO: 70.9 %
NITRITE UR QL STRIP.AUTO: NEGATIVE
NRBC BLD AUTO-RTO: 0 %
PH UR STRIP.AUTO: 6.5 [PH]
PLATELET # BLD AUTO: 345 X10(3)/MCL (ref 130–400)
PMV BLD AUTO: 9.7 FL (ref 7.4–10.4)
POTASSIUM SERPL-SCNC: 4.4 MMOL/L (ref 3.5–5.1)
PROT SERPL-MCNC: 6.9 GM/DL (ref 6.4–8.3)
PROT UR QL STRIP.AUTO: NEGATIVE
RBC # BLD AUTO: 4.28 X10(6)/MCL (ref 4.2–5.4)
RBC #/AREA URNS AUTO: ABNORMAL /HPF
RBC UR QL AUTO: ABNORMAL
SODIUM SERPL-SCNC: 135 MMOL/L (ref 136–145)
SP GR UR STRIP.AUTO: 1.03 (ref 1–1.03)
SQUAMOUS #/AREA URNS LPF: ABNORMAL /HPF
UROBILINOGEN UR STRIP-ACNC: NORMAL
WBC # SPEC AUTO: 14.38 X10(3)/MCL (ref 4.5–11.5)
WBC #/AREA URNS AUTO: ABNORMAL /HPF

## 2024-02-21 PROCEDURE — 80053 COMPREHEN METABOLIC PANEL: CPT | Performed by: PHYSICIAN ASSISTANT

## 2024-02-21 PROCEDURE — 85025 COMPLETE CBC W/AUTO DIFF WBC: CPT | Performed by: PHYSICIAN ASSISTANT

## 2024-02-21 PROCEDURE — 81001 URINALYSIS AUTO W/SCOPE: CPT | Performed by: PHYSICIAN ASSISTANT

## 2024-02-21 PROCEDURE — 25000003 PHARM REV CODE 250: Performed by: STUDENT IN AN ORGANIZED HEALTH CARE EDUCATION/TRAINING PROGRAM

## 2024-02-21 PROCEDURE — 63600175 PHARM REV CODE 636 W HCPCS: Performed by: STUDENT IN AN ORGANIZED HEALTH CARE EDUCATION/TRAINING PROGRAM

## 2024-02-21 RX ORDER — CEPHALEXIN 500 MG/1
500 CAPSULE ORAL EVERY 6 HOURS
Qty: 20 CAPSULE | Refills: 0 | Status: SHIPPED | OUTPATIENT
Start: 2024-02-21 | End: 2024-02-26

## 2024-02-21 RX ORDER — MECLIZINE HCL 12.5 MG 12.5 MG/1
12.5 TABLET ORAL
Status: COMPLETED | OUTPATIENT
Start: 2024-02-21 | End: 2024-02-21

## 2024-02-21 RX ADMIN — SODIUM CHLORIDE, POTASSIUM CHLORIDE, SODIUM LACTATE AND CALCIUM CHLORIDE 1000 ML: 600; 310; 30; 20 INJECTION, SOLUTION INTRAVENOUS at 10:02

## 2024-02-21 RX ADMIN — MECLIZINE 12.5 MG: 12.5 TABLET ORAL at 11:02

## 2024-02-22 VITALS
SYSTOLIC BLOOD PRESSURE: 136 MMHG | HEIGHT: 67 IN | BODY MASS INDEX: 42.85 KG/M2 | HEART RATE: 76 BPM | WEIGHT: 273 LBS | TEMPERATURE: 98 F | RESPIRATION RATE: 12 BRPM | DIASTOLIC BLOOD PRESSURE: 75 MMHG | OXYGEN SATURATION: 95 %

## 2024-02-22 PROCEDURE — 96361 HYDRATE IV INFUSION ADD-ON: CPT

## 2024-02-22 PROCEDURE — 96360 HYDRATION IV INFUSION INIT: CPT

## 2024-02-22 PROCEDURE — 63600175 PHARM REV CODE 636 W HCPCS: Performed by: STUDENT IN AN ORGANIZED HEALTH CARE EDUCATION/TRAINING PROGRAM

## 2024-02-22 PROCEDURE — 99285 EMERGENCY DEPT VISIT HI MDM: CPT | Mod: 25

## 2024-02-22 RX ORDER — ONDANSETRON 4 MG/1
4 TABLET, ORALLY DISINTEGRATING ORAL EVERY 8 HOURS PRN
Qty: 12 TABLET | Refills: 0 | Status: SHIPPED | OUTPATIENT
Start: 2024-02-22

## 2024-02-22 RX ADMIN — SODIUM CHLORIDE, POTASSIUM CHLORIDE, SODIUM LACTATE AND CALCIUM CHLORIDE 1000 ML: 600; 310; 30; 20 INJECTION, SOLUTION INTRAVENOUS at 12:02

## 2024-02-22 NOTE — ED PROVIDER NOTES
Encounter Date: 2024    SCRIBE #1 NOTE: I, Mulu Booker, am scribing for, and in the presence of,  Avtar Mueller MD. I have scribed the following portions of the note - Other sections scribed: HPI, ROS, PE.       History     Chief Complaint   Patient presents with    Abdominal Pain      19 wks preg c/o right sided abd pain radiating to mid abd onset 40 min pta - hx of cholecystectomy - OB: D Bebeto     28 year old  female with history of  and cholecystectomy presents to the ED with complaints of abdominal pain and dizziness onset 19:00 this evening. Pt reports that she is 18 weeks pregnant and has not had any complications with the pregnancy so far. She states that she was getting out of her car when she began having RUQ pain that wraps to her epigastrium and room-spinning dizziness when she moves around. 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 Pt denies symptoms of n/v/d, fever, vaginal bleeding, vaginal discharge, dysuria, and hematuria.     The history is provided by the patient. No  was used.     Review of patient's allergies indicates:  No Known Allergies  History reviewed. No pertinent past medical history.  Past Surgical History:   Procedure Laterality Date     SECTION      CHOLECYSTECTOMY      PLACEMENT-STENT       History reviewed. No pertinent family history.  Social History     Tobacco Use    Smoking status: Never    Smokeless tobacco: Never   Substance Use Topics    Alcohol use: Yes    Drug use: Never     Review of Systems   Constitutional:  Negative for fever.   Gastrointestinal:  Positive for abdominal pain. Negative for diarrhea, nausea and vomiting.   Genitourinary:  Negative for dysuria, hematuria, vaginal bleeding and vaginal discharge.   Neurological:  Positive for dizziness.   All other systems reviewed and are negative.      Physical Exam     Initial Vitals [24]   BP Pulse Resp  Temp SpO2   139/74 97 (!) 24 97.4 °F (36.3 °C) 100 %      MAP       --         Physical Exam    Nursing note and vitals reviewed.  Constitutional: She appears well-developed and well-nourished. She is not diaphoretic. No distress.   HENT:   Head: Normocephalic and atraumatic.   Right Ear: External ear normal.   Left Ear: External ear normal.   Nose: Nose normal.   Eyes: Conjunctivae and EOM are normal. Pupils are equal, round, and reactive to light. Right eye exhibits no discharge. Left eye exhibits no discharge.   Cardiovascular:  Normal rate, regular rhythm, normal heart sounds and intact distal pulses.     Exam reveals no gallop and no friction rub.       No murmur heard.  Pulmonary/Chest: Breath sounds normal. No respiratory distress. She has no wheezes. She has no rhonchi. She has no rales. She exhibits no tenderness.   Abdominal: Abdomen is soft. Bowel sounds are normal. She exhibits no mass.   Gravid. Mild RUQ tenderness to palpation. No RLQ tenderness.  There is no rebound and no guarding.   Musculoskeletal:         General: No edema. Normal range of motion.     Neurological: She is alert and oriented to person, place, and time. No cranial nerve deficit or sensory deficit.   Skin: Skin is warm and dry. Capillary refill takes less than 2 seconds. No erythema. No pallor.         ED Course   Procedures  Labs Reviewed   COMPREHENSIVE METABOLIC PANEL - Abnormal; Notable for the following components:       Result Value    Sodium Level 135 (*)     Albumin Level 3.3 (*)     Globulin 3.6 (*)     Albumin/Globulin Ratio 0.9 (*)     All other components within normal limits   URINALYSIS, REFLEX TO URINE CULTURE - Abnormal; Notable for the following components:    Ketones, UA Trace (*)     Blood, UA Trace (*)     Leukocyte Esterase,  (*)     WBC, UA 6-10 (*)     Mucous, UA Trace (*)     All other components within normal limits   CBC WITH DIFFERENTIAL - Abnormal; Notable for the following components:    WBC 14.38  (*)     Hgb 11.3 (*)     Hct 35.1 (*)     MCH 26.4 (*)     MCHC 32.2 (*)     Neut # 10.20 (*)     IG# 0.09 (*)     All other components within normal limits   CBC W/ AUTO DIFFERENTIAL    Narrative:     The following orders were created for panel order CBC auto differential.  Procedure                               Abnormality         Status                     ---------                               -----------         ------                     CBC with Differential[[id number]]       Abnormal            Final result                 Please view results for these tests on the individual orders.          Imaging Results              CT Head Without Contrast (Preliminary result)  Result time 02/22/24 00:37:23      Preliminary result by Eddie Prasad Jr., MD (02/22/24 00:37:23)                   Narrative:    START OF REPORT:  Technique: CT of the head was performed without intravenous contrast with axial as well as coronal and sagittal images.    Comparison: None.    Dosage Information: Automated exposure control was utilized.    Clinical history: Dizziness.    Findings:  Hemorrhage: No acute intracranial hemorrhage is seen.  CSF spaces: The ventricles sulci and basal cisterns are within normal limits.  Brain parenchyma: Unremarkable with preservation of the grey white junction throughout. No acute infarct is identified. Cavum septum pellucidum et vergae is noted, which is a normal anatomic variant.  Cerebellum: Unremarkable.  Vascular: Unremarkable venous sinuses.  Sella and skull base: The sella appears to be within normal limits for age.  Cerebellopontine angles: Within normal limits.  Herniation: None.  Intracranial calcifications: Incidental note is made of bilateral choroid plexus calcification. Incidental note is made of some pineal region calcification.  Calvarium: No acute linear or depressed skull fracture is seen.    Maxillofacial Structures:  Paranasal sinuses: There is some opacity in the bilateral  maxillary sinuses, more on the right. This may reflect sinus disease. Correlate clinically. The rest of the paranasal sinuses appear clear.  Orbits: The orbits appear unremarkable.  Zygomatic arches: The zygomatic arches are intact and unremarkable.  Temporal bones and mastoids: The temporal bones and mastoids appear unremarkable.  TMJ: The mandibular condyles appear normally placed with respect to the mandibular fossa.      Impression:  1. No acute intracranial process identified. Details and findings as noted above.                                         US Abdomen Limited_Gallbladder (In process)    Procedure changed from US Abdomen Limited (Gallbladder)                    US OB Limited 1 Or More Gestations (Final result)  Result time 02/21/24 21:01:59      Final result by Jorge Roberts MD (02/21/24 21:01:59)                   Impression:      Single viable intrauterine pregnancy with appropriate fetal heart rate of 155 beats per minute. Presentation is vertex. Sonographic age of 18 weeks 5 days. Placenta is positioned anteriorly.  Fetal movement is appreciated  Recommend appropriate follow-up with OB.      Electronically signed by: Jorge Roberts  Date:    02/21/2024  Time:    21:01               Narrative:    EXAMINATION:  US OB LIMITED 1 OR MORE GESTATIONS    CLINICAL HISTORY:  right sided abdominal pain;    TECHNIQUE:  Transabdominal images of the pelvis were obtained. Images obtained in grayscale and color.    COMPARISON:  No prior imaging available for comparison.    FINDINGS:  Single viable intrauterine pregnancy with appropriate fetal heart rate of 155 beats per minute.  Presentation is vertex.  Sonographic age of 18 weeks 5 days.  Placenta is positioned anteriorly.  Fetal movement is appreciated                                       Medications   meclizine tablet 12.5 mg (12.5 mg Oral Given 2/21/24 2310)   lactated ringers bolus 1,000 mL (0 mLs Intravenous Stopped 2/21/24 2341)   lactated ringers  bolus 1,000 mL (0 mLs Intravenous Stopped 2/22/24 0148)     Medical Decision Making  Differential diagnosis includes but is not limited to abdominal pain, round the ligament syndrome, retained stone, discomfort pregnancy.  Dehydration.      Patient is awake alert well-appearing.  Initially she complains of abdominal pain that is resolved in his requesting to eat here in the emergency department.  Later she says she began to feel somewhat dizzy and unsteady.  She was given 2 L of fluid on re-evaluation she was able to ambulate without difficulty.  Her labs are unrevealing other than possible UTI.  Given she was is pregnant we will treat empirically with Keflex.  CT of the head is unrevealing.  She was no focal neuro deficits.  She was able to ambulate without difficulty.  Plan for discharge home at this time.  Patient was comfortable with plan. Return precautions given.  Questions invited, questions answered to the best my ability.  Patient discharged home condition stable.        Amount and/or Complexity of Data Reviewed  External Data Reviewed: notes.     Details: Chart review reveals that patient was seen at outside hospital 02/11/2023 for vaginal bleeding during pregnancy.  Prior to that 08/06/2023 seen for atypical chest pain.  Labs: ordered. Decision-making details documented in ED Course.  Radiology: ordered and independent interpretation performed.     Details: CT head unrevealing    Risk  OTC drugs.  Prescription drug management.              Attending Attestation:           Physician Attestation for Scribe:  Physician Attestation Statement for Scribe #1: I, Avtar Mueller MD, reviewed documentation, as scribed by Mulu Booker in my presence, and it is both accurate and complete.             ED Course as of 02/22/24 0506   Wed Feb 21, 2024   2231 Urinalysis, Reflex to Urine Culture(!)  Possible UTI [MM]   2231 Comprehensive metabolic panel(!)  No significant electrolyte or renal dysfunction. [MM]   2231  CBC auto differential(!)  Leukocytosis with left shift.  Stable anemia. [MM]   Thu Feb 22, 2024   0001 Chart review reveals patient was seen at outside hospital 08/06/2023.  Chief complaint at time was chest pain pain to right arm. [MM]   0231 On re-evaluation patient is sleeping quietly.  Awakens without any difficulty.  Reports that she feels better.  Able to ambulate to the restroom without any difficulty.  Steady gait.  Denies any dizziness.  Finger-to-nose intact. [MM]      ED Course User Index  [MM] Avtar Mueller MD                           Clinical Impression:  Final diagnoses:  [R10.11] RUQ pain  [O26.892, R10.9] Abdominal pain during pregnancy in second trimester (Primary)  [R42] Dizziness  [O23.42] Urinary tract infection in mother during second trimester of pregnancy          ED Disposition Condition    Discharge Stable          ED Prescriptions       Medication Sig Dispense Start Date End Date Auth. Provider    cephALEXin (KEFLEX) 500 MG capsule Take 1 capsule (500 mg total) by mouth every 6 (six) hours. for 5 days 20 capsule 2/21/2024 2/26/2024 Avtar Mueller MD    ondansetron (ZOFRAN-ODT) 4 MG TbDL Take 1 tablet (4 mg total) by mouth every 8 (eight) hours as needed (nausea vomiting). 12 tablet 2/22/2024 -- Avtar Mueller MD          Follow-up Information       Follow up With Specialties Details Why Contact Info    Danielle Watts MD Family Medicine Call   [address] [facility] [address]  [phone]      Ian Tate Jr., MD Obstetrics and Gynecology Call   [address] [facility] [address]  [phone]      [facility] - Emergency Dept Emergency Medicine  If symptoms worsen [address] [facility] [id number] [phone]             Avtar Mueller MD  02/22/24 1414

## 2024-02-22 NOTE — DISCHARGE INSTRUCTIONS

## 2024-02-22 NOTE — FIRST PROVIDER EVALUATION
"Medical screening examination initiated.  I have conducted a focused provider triage encounter, findings are as follows:    Chief Complaint   Patient presents with    Abdominal Pain      19 wks preg c/o right sided abd pain radiating to mid abd onset 40 min pta - hx of cholecystectomy - OB: ALVIN Tate     Brief history of present illness:  28 y.o.  approx 19-week pregnant female presents to the ED with right sided abdominal pain onset 40 minutes ago. Denies n/v, dysuria, vaginal bleeding. Sees Dr Ian Tate. Hx of cholecystectomy; still has appendix    Vitals:    24   BP: 139/74   BP Location: Left arm   Patient Position: Sitting   Pulse: 97   Resp: (!) 24   Temp: 97.4 °F (36.3 °C)   TempSrc: Oral   SpO2: 100%   Weight: 123.8 kg (273 lb)   Height: 5' 7" (1.702 m)       Pertinent physical exam:  Awake, alert, ambulatory, non-labored respirations    Brief workup plan:  labs, UA    Preliminary workup initiated; this workup will be continued and followed by the physician or advanced practice provider that is assigned to the patient when roomed.  "

## 2024-04-18 ENCOUNTER — HOSPITAL ENCOUNTER (EMERGENCY)
Facility: HOSPITAL | Age: 29
Discharge: HOME | End: 2024-04-18
Attending: OBSTETRICS & GYNECOLOGY
Payer: MEDICAID

## 2024-04-18 VITALS
OXYGEN SATURATION: 99 % | DIASTOLIC BLOOD PRESSURE: 68 MMHG | TEMPERATURE: 98 F | WEIGHT: 271 LBS | HEART RATE: 90 BPM | HEIGHT: 68 IN | SYSTOLIC BLOOD PRESSURE: 134 MMHG | BODY MASS INDEX: 41.07 KG/M2 | RESPIRATION RATE: 17 BRPM

## 2024-04-18 DIAGNOSIS — R07.9 CHEST PAIN: ICD-10-CM

## 2024-04-18 DIAGNOSIS — R00.2 PALPITATIONS: Primary | ICD-10-CM

## 2024-04-18 DIAGNOSIS — R42 DIZZINESS: ICD-10-CM

## 2024-04-18 DIAGNOSIS — R20.0 LEG NUMBNESS: ICD-10-CM

## 2024-04-18 LAB
APPEARANCE UR: CLEAR
BACTERIA #/AREA URNS AUTO: ABNORMAL /HPF
BILIRUB UR QL STRIP.AUTO: NEGATIVE
COLOR UR AUTO: YELLOW
GLUCOSE UR QL STRIP.AUTO: NORMAL
KETONES UR QL STRIP.AUTO: NEGATIVE
LEUKOCYTE ESTERASE UR QL STRIP.AUTO: NEGATIVE
MUCOUS THREADS URNS QL MICRO: ABNORMAL /LPF
NITRITE UR QL STRIP.AUTO: NEGATIVE
PH UR STRIP.AUTO: 6 [PH]
PROT UR QL STRIP.AUTO: ABNORMAL
RBC #/AREA URNS AUTO: ABNORMAL /HPF
RBC UR QL AUTO: ABNORMAL
SP GR UR STRIP.AUTO: 1.03 (ref 1–1.03)
SQUAMOUS #/AREA URNS LPF: ABNORMAL /HPF
UROBILINOGEN UR STRIP-ACNC: NORMAL
WBC #/AREA URNS AUTO: ABNORMAL /HPF

## 2024-04-18 PROCEDURE — 99285 EMERGENCY DEPT VISIT HI MDM: CPT | Mod: 25

## 2024-04-18 PROCEDURE — 81001 URINALYSIS AUTO W/SCOPE: CPT | Performed by: OBSTETRICS & GYNECOLOGY

## 2024-04-18 RX ORDER — ACETAMINOPHEN 500 MG
1000 TABLET ORAL
Status: DISCONTINUED | OUTPATIENT
Start: 2024-04-18 | End: 2024-04-18 | Stop reason: HOSPADM

## 2024-04-18 NOTE — ED PROVIDER NOTES
Encounter Date: 2024    SCRIBE #1 NOTE: I, am scribing for, and in the presence of,  . I have scribed the entire note. the Triage Note.       History     Chief Complaint   Patient presents with    tingling in legs     Pt c/o tingling in legs, sharp pain in abdomen for the last hour, chest pain.     HPI  Review of patient's allergies indicates:  No Known Allergies  No past medical history on file.  Past Surgical History:   Procedure Laterality Date     SECTION      CHOLECYSTECTOMY      PLACEMENT-STENT       No family history on file.  Social History     Tobacco Use    Smoking status: Never    Smokeless tobacco: Never   Substance Use Topics    Alcohol use: Yes    Drug use: Never     Review of Systems    Physical Exam     Initial Vitals [24 1506]   BP Pulse Resp Temp SpO2   138/78 106 17 98.4 °F (36.9 °C) 98 %      MAP       --         Physical Exam    ED Course   Procedures  Labs Reviewed   URINALYSIS, REFLEX TO URINE CULTURE - Abnormal; Notable for the following components:       Result Value    Specific Gravity, UA 1.032 (*)     Protein, UA Trace (*)     Blood, UA Trace (*)     Mucous, UA Occasional (*)     All other components within normal limits          Imaging Results              X-Ray Chest AP Portable (Final result)  Result time 24 16:22:43      Final result by Silvano Leigh MD (24 16:22:43)                   Impression:      No acute pulmonary process identified.      Electronically signed by: Silvano Leigh  Date:    2024  Time:    16:22               Narrative:    EXAMINATION:  XR CHEST AP PORTABLE    CLINICAL HISTORY:  Chest pain, unspecified    TECHNIQUE:  Frontal view(s) of the chest.    COMPARISON:  Radiography 2023    FINDINGS:  Normal cardiac silhouette.  The lungs are well-inflated.  No consolidation identified.  No significant pleural effusion or discernible pneumothorax.                                       Medications   acetaminophen tablet 1,000 mg  (has no administration in time range)     Medical Decision Making  Amount and/or Complexity of Data Reviewed  Radiology: ordered.    Risk  OTC drugs.                                      Clinical Impression:   This  @ 27wks presents with chest palpitations, and leg numbness of recent onset. She also complaints of upper abdominal cramping. Reports +FM, no LOF, no UB, +CTX. Kishore SOB, or chest pain.     Tracing: reactive  VE: C/L/P      A/P: PE ruled out,  contractions and Uti ruled out  -UA  -IV fluids and tylenol  -CXR, lower extremity dopplers  -no abnormal findings  -discharged home   ED Disposition Condition    Discharge           ED Prescriptions    None       Follow-up Information       Follow up With Specialties Details Why Contact Info    Ian Tate Jr., MD Obstetrics and Gynecology Call As needed, If symptoms worsen; otherwise keep scheduled appt [address] [facility] [address]  [phone]

## 2024-05-02 ENCOUNTER — HOSPITAL ENCOUNTER (EMERGENCY)
Facility: HOSPITAL | Age: 29
Discharge: HOME | End: 2024-05-02
Attending: SPECIALIST
Payer: MEDICAID

## 2024-05-02 VITALS
DIASTOLIC BLOOD PRESSURE: 69 MMHG | TEMPERATURE: 99 F | OXYGEN SATURATION: 100 % | HEART RATE: 88 BPM | HEIGHT: 67 IN | BODY MASS INDEX: 42.22 KG/M2 | RESPIRATION RATE: 18 BRPM | SYSTOLIC BLOOD PRESSURE: 133 MMHG | WEIGHT: 269 LBS

## 2024-05-02 PROCEDURE — 99284 EMERGENCY DEPT VISIT MOD MDM: CPT

## 2024-05-03 ENCOUNTER — LAB VISIT (OUTPATIENT)
Dept: LAB | Facility: HOSPITAL | Age: 29
End: 2024-05-03
Attending: OBSTETRICS & GYNECOLOGY
Payer: MEDICAID

## 2024-05-03 DIAGNOSIS — Z34.83 PRENATAL CARE, SUBSEQUENT PREGNANCY IN THIRD TRIMESTER: Primary | ICD-10-CM

## 2024-05-03 LAB
GLUCOSE 1H P 100 G GLC PO SERPL-MCNC: 114 MG/DL (ref 100–180)
HCT VFR BLD AUTO: 35.7 % (ref 37–47)
HGB BLD-MCNC: 11.1 G/DL (ref 12–16)

## 2024-05-03 PROCEDURE — 85018 HEMOGLOBIN: CPT

## 2024-05-03 PROCEDURE — 36415 COLL VENOUS BLD VENIPUNCTURE: CPT

## 2024-05-03 PROCEDURE — 82950 GLUCOSE TEST: CPT
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
## 2024-05-28 ENCOUNTER — HOSPITAL ENCOUNTER (EMERGENCY)
Facility: HOSPITAL | Age: 29
Discharge: HOME | End: 2024-05-28
Attending: SPECIALIST | Admitting: SPECIALIST
Payer: MEDICAID

## 2024-05-28 VITALS
HEIGHT: 68 IN | HEART RATE: 85 BPM | SYSTOLIC BLOOD PRESSURE: 131 MMHG | DIASTOLIC BLOOD PRESSURE: 73 MMHG | BODY MASS INDEX: 40.9 KG/M2 | RESPIRATION RATE: 18 BRPM | TEMPERATURE: 98 F

## 2024-05-28 PROCEDURE — 99284 EMERGENCY DEPT VISIT MOD MDM: CPT | Mod: 25

## 2024-05-28 NOTE — ED PROVIDER NOTES
"       SUZE NOTE     Admit Date: 2024  SUZE Physician: Ozzy Garner  Primary OBGYN: Dr. Ian Tate    Chief Complaint   Patient presents with    Abdominal Pain     Pt c/o abdominal pain since last night at 11pm. Irregular ctx.       Hospital Course:  Latonya Juares is a 29 y.o.  at 32w4d presents complaining of lower abdominal suprapubic area pain with pressure in the vagina.  Also lower back pain.  Pains did not appear to be described as consistent with uterine contractions.  Patient also complaining about not feeling fetal movement since earlier this morning.    This IUP is complicated by no reported problems..    Patient denies vaginal bleeding, leakage of fluid, headache, vision changes, RUQ pain, dysuria, fever, and nausea/vomiting.  Fetal Movement:  None at present. .      /73   Pulse 85   Temp 98.2 °F (36.8 °C)   Resp 18   Ht 5' 8" (1.727 m)   BMI 40.90 kg/m²   Temp:  [98.2 °F (36.8 °C)] 98.2 °F (36.8 °C)  Pulse:  [85] 85  Resp:  [18] 18  BP: (131)/(73) 131/73    General: in no respiratory distress and acyanotic  Cardiovascular: regular rate and rhythm no murmurs  Respiratory: clear to auscultation, no wheezes, rales or rhonchi, symmetric air entry  Abdominal: fundus soft, nontender except suprapubic area with presenting fetal part.  Thirty-two weeks size  Back: lumbar tenderness present CVA tenderness none  Extremeties no redness or tenderness in the calves or thighs no edema    SSE:   SVE:  Deferred      FHT:  Present with limited accelerations are variability.    TOCO:  No contractions are noted.    Medical Decision Making:  The patient's discomforts are consistent with 3rd trimester pregnancy discomfort.  Patient will be kept on continuous external fetal monitoring.  A biophysical profile will be obtained.    Re-evaluation: Biophysical profile was reported as 8/8.  Fetal heart rate tracing is improved.  Patient is now feeling consistent fetal movement and is " reassured.    LABS:   No results found for this or any previous visit (from the past 24 hour(s)).  [unfilled]     Imaging Results    None          ASSESMENT: Latonya Juares is a 29 y.o.   at 32w4d with decreased fetal movement now resolved.  No contractions noted.  Reassuring antepartum testing.  Mild suprapubic pain consistent with presenting fetal part.    Discharge Diagnosis/Clinical Impression:  Pregnancy 32 weeks.  Decreased fetal movement.  Abdominal round ligament type pain.    Status:Improved/stable    Disposition:  discharged to home    Medications:       Patient Instructions:   Current Discharge Medication List        CONTINUE these medications which have NOT CHANGED    Details   ondansetron (ZOFRAN-ODT) 4 MG TbDL Take 1 tablet (4 mg total) by mouth every 8 (eight) hours as needed (nausea vomiting).  Qty: 12 tablet, Refills: 0      prenatal 25/iron fum/folic/dha (PRENATAL-1 ORAL) Take 1 tablet by mouth once daily.             - Pt was given routine pregnancy instructions including to return to triage if she had any vaginal bleeding (other than spotting for the next 48hrs), any loss of fluid like her water broke, decreased fetal movement, or contractions Q 5min lasting for 2 or more hours. Pt was also instructed to drink copious water. Patient voiced understanding of all these instructions and was subsequently discharged home.    She will follow up with her primary OB.    No discharge procedures on file.    Ozzy Garner MD  OB-GYN Hospitalist       Ozzy Garner MD  24 [phone]

## 2024-06-17 ENCOUNTER — HOSPITAL ENCOUNTER (EMERGENCY)
Facility: HOSPITAL | Age: 29
Discharge: HOME | End: 2024-06-17
Payer: MEDICAID

## 2024-06-17 VITALS
DIASTOLIC BLOOD PRESSURE: 65 MMHG | WEIGHT: 280 LBS | TEMPERATURE: 98 F | RESPIRATION RATE: 20 BRPM | HEIGHT: 67 IN | HEART RATE: 88 BPM | SYSTOLIC BLOOD PRESSURE: 133 MMHG | BODY MASS INDEX: 43.95 KG/M2

## 2024-06-17 PROCEDURE — 99284 EMERGENCY DEPT VISIT MOD MDM: CPT

## 2024-06-18 NOTE — ED PROVIDER NOTES
"SUZE NOTE     Admit Date: 2024  SUZE Physician: James Quezada  Primary OBGYN: Dr. Ian Tate    Admit Diagnosis/Chief Complaint: Pelvic Pain      Chief Complaint   Patient presents with    Abdominal Pain     29 yp  prev c/s x2 at 35.3 wks presents to triage c/o vaginal pressure and abd pain. Denies bleeding or LOF. States positive fetal movement. Abd soft and non tender when palpated. Denies hx of HTN, GDM, bleeding problems or placenta issues. SVE as noted. V/s as noted       Hospital Course:  Latonya Juares is a 29 y.o.  at 35w3d presents complaining of contractions      Patient states no complications in this pregnancy.     Patient denies vaginal bleeding, leakage of fluid, and contractions.  Fetal Movement: normal.    /65 (BP Location: Right arm)   Pulse 88   Temp 98.4 °F (36.9 °C) (Oral)   Resp 20   Ht 5' 7" (1.702 m)   Wt 127 kg (280 lb)   BMI 43.85 kg/m²   Temp:  [98.4 °F (36.9 °C)] 98.4 °F (36.9 °C)  Pulse:  [88] 88  Resp:  [20] 20  BP: (133)/(65) 133/65    General: in no apparent distress  Abdominal: soft, nontender, nondistended, no abnormal masses, no epigastric pain FHT present  Back: lumbar tenderness absent   CVA tenderness none  Extremeties no redness or tenderness in the calves or thighs no edema    SSE:   SVE:SVE (PeriWATCH)  Dilation (cm): 0  Effacement (%): 40  Station: -3  Cervical Consistency: Medium  Examined by:: GERSON Mesa RN  Simplified Banks Score: 1     FHT:  Reactive  TOCO: Contractions none      LABS:   No results found for this or any previous visit (from the past 24 hour(s)).  [unfilled]     Imaging Results    None          ASSESMENT: Latonya Juares is a 29 y.o.   at 35w3d with Contractions  Observation in SUZE  Rule out labor  We will reevaluate cervix if petra  If no contractions or cervical change, will discharge from hospital  Labor precautions reviewed with patient  ER precautions reviewed with patient  Patient was " given an opportunity to ask questions  She is to follow-up with her primary care physician        Discharge Diagnosis/Clinical Impression**: Rule Out Labor, Contractions in Pregnancy, False Labor  Status:Stable    Patient Instructions:       - Pt was given routine pregnancy instructions including to return to triage if she had any vaginal bleeding (other than spotting for the next 48hrs), any loss of fluid like her water broke, decreased fetal movement, or contractions Q 5min lasting for 2 or more hours. Pt was also instructed to drink copious water. Patient voiced understanding of all these instructions and was subsequently discharged home.    She will follow up with her primary OB.      This note was created with the assistance of [employer] voice recognition software. There may be transcription errors as a result of using this technology however minimal. Effort has been made to assure accuracy of transcription but any obvious errors or omissions should be clarified with the author of the document.

## 2024-06-25 LAB — PRENATAL STREP B CULTURE: NEGATIVE

## 2024-07-08 ENCOUNTER — ANESTHESIA EVENT (OUTPATIENT)
Dept: OBSTETRICS AND GYNECOLOGY | Facility: HOSPITAL | Age: 29
End: 2024-07-08
Payer: MEDICAID

## 2024-07-08 ENCOUNTER — ANESTHESIA (OUTPATIENT)
Dept: OBSTETRICS AND GYNECOLOGY | Facility: HOSPITAL | Age: 29
End: 2024-07-08
Payer: MEDICAID

## 2024-07-08 ENCOUNTER — HOSPITAL ENCOUNTER (INPATIENT)
Facility: HOSPITAL | Age: 29
LOS: 4 days | Discharge: HOME | End: 2024-07-12
Attending: OBSTETRICS & GYNECOLOGY | Admitting: OBSTETRICS & GYNECOLOGY
Payer: MEDICAID

## 2024-07-08 DIAGNOSIS — Z98.891 S/P CESAREAN SECTION: Primary | ICD-10-CM

## 2024-07-08 LAB
ALBUMIN SERPL-MCNC: 3.1 G/DL (ref 3.5–5)
ALBUMIN/GLOB SERPL: 0.8 RATIO (ref 1.1–2)
ALP SERPL-CCNC: 143 UNIT/L (ref 40–150)
ALT SERPL-CCNC: 20 UNIT/L (ref 0–55)
ANION GAP SERPL CALC-SCNC: 11 MEQ/L
AST SERPL-CCNC: 19 UNIT/L (ref 5–34)
BASOPHILS # BLD AUTO: 0.03 X10(3)/MCL
BASOPHILS NFR BLD AUTO: 0.2 %
BILIRUB SERPL-MCNC: 0.6 MG/DL
BUN SERPL-MCNC: 8.8 MG/DL (ref 7–18.7)
CALCIUM SERPL-MCNC: 9.5 MG/DL (ref 8.4–10.2)
CHLORIDE SERPL-SCNC: 106 MMOL/L (ref 98–107)
CO2 SERPL-SCNC: 19 MMOL/L (ref 22–29)
CREAT SERPL-MCNC: 0.63 MG/DL (ref 0.55–1.02)
CREAT/UREA NIT SERPL: 14
EOSINOPHIL # BLD AUTO: 0.07 X10(3)/MCL (ref 0–0.9)
EOSINOPHIL NFR BLD AUTO: 0.5 %
ERYTHROCYTE [DISTWIDTH] IN BLOOD BY AUTOMATED COUNT: 14.6 % (ref 11.5–17)
GFR SERPLBLD CREATININE-BSD FMLA CKD-EPI: >60 ML/MIN/1.73/M2
GLOBULIN SER-MCNC: 4.1 GM/DL (ref 2.4–3.5)
GLUCOSE SERPL-MCNC: 80 MG/DL (ref 74–100)
GROUP & RH: NORMAL
HCT VFR BLD AUTO: 38.6 % (ref 37–47)
HGB BLD-MCNC: 12.7 G/DL (ref 12–16)
IMM GRANULOCYTES # BLD AUTO: 0.12 X10(3)/MCL (ref 0–0.04)
IMM GRANULOCYTES NFR BLD AUTO: 0.9 %
INDIRECT COOMBS: NORMAL
LDH SERPL-CCNC: 148 U/L (ref 125–220)
LYMPHOCYTES # BLD AUTO: 1.93 X10(3)/MCL (ref 0.6–4.6)
LYMPHOCYTES NFR BLD AUTO: 14.9 %
MCH RBC QN AUTO: 27.2 PG (ref 27–31)
MCHC RBC AUTO-ENTMCNC: 32.9 G/DL (ref 33–36)
MCV RBC AUTO: 82.7 FL (ref 80–94)
MONOCYTES # BLD AUTO: 0.75 X10(3)/MCL (ref 0.1–1.3)
MONOCYTES NFR BLD AUTO: 5.8 %
NEUTROPHILS # BLD AUTO: 10.07 X10(3)/MCL (ref 2.1–9.2)
NEUTROPHILS NFR BLD AUTO: 77.7 %
NRBC BLD AUTO-RTO: 0 %
PLATELET # BLD AUTO: 369 X10(3)/MCL (ref 130–400)
PMV BLD AUTO: 9.8 FL (ref 7.4–10.4)
POTASSIUM SERPL-SCNC: 4.6 MMOL/L (ref 3.5–5.1)
PROT SERPL-MCNC: 7.2 GM/DL (ref 6.4–8.3)
RBC # BLD AUTO: 4.67 X10(6)/MCL (ref 4.2–5.4)
SODIUM SERPL-SCNC: 136 MMOL/L (ref 136–145)
SPECIMEN OUTDATE: NORMAL
T PALLIDUM AB SER QL: NONREACTIVE
URATE SERPL-MCNC: 5.3 MG/DL (ref 2.6–6)
WBC # BLD AUTO: 12.97 X10(3)/MCL (ref 4.5–11.5)

## 2024-07-08 PROCEDURE — 80053 COMPREHEN METABOLIC PANEL: CPT | Performed by: OBSTETRICS & GYNECOLOGY

## 2024-07-08 PROCEDURE — 36004724 HC OB OR TIME LEV III - 1ST 15 MIN: Performed by: OBSTETRICS & GYNECOLOGY

## 2024-07-08 PROCEDURE — 86900 BLOOD TYPING SEROLOGIC ABO: CPT | Performed by: OBSTETRICS & GYNECOLOGY

## 2024-07-08 PROCEDURE — 25000003 PHARM REV CODE 250: Performed by: OBSTETRICS & GYNECOLOGY

## 2024-07-08 PROCEDURE — 36004725 HC OB OR TIME LEV III - EA ADD 15 MIN: Performed by: OBSTETRICS & GYNECOLOGY

## 2024-07-08 PROCEDURE — 63600175 PHARM REV CODE 636 W HCPCS: Mod: JZ,JG | Performed by: ANESTHESIOLOGY

## 2024-07-08 PROCEDURE — 51702 INSERT TEMP BLADDER CATH: CPT

## 2024-07-08 PROCEDURE — 83615 LACTATE (LD) (LDH) ENZYME: CPT | Performed by: OBSTETRICS & GYNECOLOGY

## 2024-07-08 PROCEDURE — 63600175 PHARM REV CODE 636 W HCPCS

## 2024-07-08 PROCEDURE — 71000033 HC RECOVERY, INTIAL HOUR: Performed by: OBSTETRICS & GYNECOLOGY

## 2024-07-08 PROCEDURE — 99900035 HC TECH TIME PER 15 MIN (STAT)

## 2024-07-08 PROCEDURE — 11000001 HC ACUTE MED/SURG PRIVATE ROOM

## 2024-07-08 PROCEDURE — 62322 NJX INTERLAMINAR LMBR/SAC: CPT | Performed by: ANESTHESIOLOGY

## 2024-07-08 PROCEDURE — 27000492 HC SLEEVE, SCD T/L

## 2024-07-08 PROCEDURE — 25000003 PHARM REV CODE 250

## 2024-07-08 PROCEDURE — 86780 TREPONEMA PALLIDUM: CPT | Performed by: OBSTETRICS & GYNECOLOGY

## 2024-07-08 PROCEDURE — 99900059 HC C-SECTION ATTEND (STAT)

## 2024-07-08 PROCEDURE — 84550 ASSAY OF BLOOD/URIC ACID: CPT | Performed by: OBSTETRICS & GYNECOLOGY

## 2024-07-08 PROCEDURE — 63600175 PHARM REV CODE 636 W HCPCS: Performed by: OBSTETRICS & GYNECOLOGY

## 2024-07-08 PROCEDURE — 37000008 HC ANESTHESIA 1ST 15 MINUTES: Performed by: OBSTETRICS & GYNECOLOGY

## 2024-07-08 PROCEDURE — 37000009 HC ANESTHESIA EA ADD 15 MINS: Performed by: OBSTETRICS & GYNECOLOGY

## 2024-07-08 PROCEDURE — 85025 COMPLETE CBC W/AUTO DIFF WBC: CPT | Performed by: OBSTETRICS & GYNECOLOGY

## 2024-07-08 RX ORDER — DEXTROSE, SODIUM CHLORIDE, SODIUM LACTATE, POTASSIUM CHLORIDE, AND CALCIUM CHLORIDE 5; .6; .31; .03; .02 G/100ML; G/100ML; G/100ML; G/100ML; G/100ML
INJECTION, SOLUTION INTRAVENOUS CONTINUOUS
Status: DISCONTINUED | OUTPATIENT
Start: 2024-07-08 | End: 2024-07-12 | Stop reason: HOSPADM

## 2024-07-08 RX ORDER — ONDANSETRON HYDROCHLORIDE 2 MG/ML
4 INJECTION, SOLUTION INTRAVENOUS EVERY 12 HOURS PRN
Status: DISCONTINUED | OUTPATIENT
Start: 2024-07-08 | End: 2024-07-08

## 2024-07-08 RX ORDER — ACETAMINOPHEN 325 MG/1
650 TABLET ORAL EVERY 4 HOURS PRN
Status: DISCONTINUED | OUTPATIENT
Start: 2024-07-08 | End: 2024-07-12 | Stop reason: HOSPADM

## 2024-07-08 RX ORDER — SODIUM CITRATE AND CITRIC ACID MONOHYDRATE 334; 500 MG/5ML; MG/5ML
30 SOLUTION ORAL
Status: DISCONTINUED | OUTPATIENT
Start: 2024-07-08 | End: 2024-07-08

## 2024-07-08 RX ORDER — OXYTOCIN-SODIUM CHLORIDE 0.9% IV SOLN 30 UNIT/500ML 30-0.9/5 UT/ML-%
95 SOLUTION INTRAVENOUS ONCE
Status: COMPLETED | OUTPATIENT
Start: 2024-07-08 | End: 2024-07-08

## 2024-07-08 RX ORDER — SIMETHICONE 80 MG
1 TABLET,CHEWABLE ORAL EVERY 4 HOURS PRN
Status: DISCONTINUED | OUTPATIENT
Start: 2024-07-08 | End: 2024-07-12 | Stop reason: HOSPADM

## 2024-07-08 RX ORDER — OXYTOCIN-SODIUM CHLORIDE 0.9% IV SOLN 30 UNIT/500ML 30-0.9/5 UT/ML-%
95 SOLUTION INTRAVENOUS ONCE
Status: DISCONTINUED | OUTPATIENT
Start: 2024-07-08 | End: 2024-07-12 | Stop reason: HOSPADM

## 2024-07-08 RX ORDER — NALOXONE HCL 0.4 MG/ML
0.04 VIAL (ML) INJECTION
Status: DISCONTINUED | OUTPATIENT
Start: 2024-07-08 | End: 2024-07-12 | Stop reason: HOSPADM

## 2024-07-08 RX ORDER — ACETAMINOPHEN 10 MG/ML
INJECTION, SOLUTION INTRAVENOUS
Status: DISCONTINUED | OUTPATIENT
Start: 2024-07-08 | End: 2024-07-08

## 2024-07-08 RX ORDER — PHENYLEPHRINE HYDROCHLORIDE 10 MG/ML
INJECTION INTRAVENOUS
Status: DISCONTINUED | OUTPATIENT
Start: 2024-07-08 | End: 2024-07-08

## 2024-07-08 RX ORDER — MISOPROSTOL 100 UG/1
800 TABLET ORAL
Status: DISCONTINUED | OUTPATIENT
Start: 2024-07-08 | End: 2024-07-12 | Stop reason: HOSPADM

## 2024-07-08 RX ORDER — MUPIROCIN 20 MG/G
OINTMENT TOPICAL
Status: DISCONTINUED | OUTPATIENT
Start: 2024-07-08 | End: 2024-07-12 | Stop reason: HOSPADM

## 2024-07-08 RX ORDER — DOCUSATE SODIUM 100 MG/1
200 CAPSULE, LIQUID FILLED ORAL 2 TIMES DAILY
Status: DISCONTINUED | OUTPATIENT
Start: 2024-07-08 | End: 2024-07-12 | Stop reason: HOSPADM

## 2024-07-08 RX ORDER — IBUPROFEN 600 MG/1
600 TABLET ORAL EVERY 6 HOURS
Status: DISCONTINUED | OUTPATIENT
Start: 2024-07-09 | End: 2024-07-12 | Stop reason: HOSPADM

## 2024-07-08 RX ORDER — HYDROMORPHONE HYDROCHLORIDE 2 MG/ML
1 INJECTION, SOLUTION INTRAMUSCULAR; INTRAVENOUS; SUBCUTANEOUS EVERY 4 HOURS PRN
Status: DISCONTINUED | OUTPATIENT
Start: 2024-07-08 | End: 2024-07-12 | Stop reason: HOSPADM

## 2024-07-08 RX ORDER — SODIUM CHLORIDE, SODIUM LACTATE, POTASSIUM CHLORIDE, CALCIUM CHLORIDE 600; 310; 30; 20 MG/100ML; MG/100ML; MG/100ML; MG/100ML
INJECTION, SOLUTION INTRAVENOUS CONTINUOUS
Status: DISCONTINUED | OUTPATIENT
Start: 2024-07-08 | End: 2024-07-12 | Stop reason: HOSPADM

## 2024-07-08 RX ORDER — OXYCODONE AND ACETAMINOPHEN 10; 325 MG/1; MG/1
1 TABLET ORAL EVERY 6 HOURS PRN
Status: DISCONTINUED | OUTPATIENT
Start: 2024-07-08 | End: 2024-07-12 | Stop reason: HOSPADM

## 2024-07-08 RX ORDER — SODIUM CITRATE AND CITRIC ACID MONOHYDRATE 334; 500 MG/5ML; MG/5ML
30 SOLUTION ORAL ONCE
Status: DISCONTINUED | OUTPATIENT
Start: 2024-07-08 | End: 2024-07-08

## 2024-07-08 RX ORDER — METHYLERGONOVINE MALEATE 0.2 MG/ML
200 INJECTION INTRAVENOUS ONCE AS NEEDED
Status: DISCONTINUED | OUTPATIENT
Start: 2024-07-08 | End: 2024-07-12 | Stop reason: HOSPADM

## 2024-07-08 RX ORDER — KETOROLAC TROMETHAMINE 30 MG/ML
INJECTION, SOLUTION INTRAMUSCULAR; INTRAVENOUS
Status: DISCONTINUED | OUTPATIENT
Start: 2024-07-08 | End: 2024-07-08

## 2024-07-08 RX ORDER — ONDANSETRON 4 MG/1
8 TABLET, ORALLY DISINTEGRATING ORAL EVERY 8 HOURS PRN
Status: DISCONTINUED | OUTPATIENT
Start: 2024-07-08 | End: 2024-07-12 | Stop reason: HOSPADM

## 2024-07-08 RX ORDER — DIPHENHYDRAMINE HYDROCHLORIDE 50 MG/ML
25 INJECTION INTRAMUSCULAR; INTRAVENOUS EVERY 4 HOURS PRN
Status: DISCONTINUED | OUTPATIENT
Start: 2024-07-08 | End: 2024-07-12 | Stop reason: HOSPADM

## 2024-07-08 RX ORDER — MORPHINE SULFATE 1 MG/ML
INJECTION, SOLUTION EPIDURAL; INTRATHECAL; INTRAVENOUS
Status: DISCONTINUED | OUTPATIENT
Start: 2024-07-08 | End: 2024-07-08

## 2024-07-08 RX ORDER — OXYTOCIN 10 [USP'U]/ML
10 INJECTION, SOLUTION INTRAMUSCULAR; INTRAVENOUS ONCE AS NEEDED
Status: DISCONTINUED | OUTPATIENT
Start: 2024-07-08 | End: 2024-07-12 | Stop reason: HOSPADM

## 2024-07-08 RX ORDER — FENTANYL CITRATE 50 UG/ML
INJECTION, SOLUTION INTRAMUSCULAR; INTRAVENOUS
Status: DISCONTINUED | OUTPATIENT
Start: 2024-07-08 | End: 2024-07-08

## 2024-07-08 RX ORDER — KETOROLAC TROMETHAMINE 30 MG/ML
30 INJECTION, SOLUTION INTRAMUSCULAR; INTRAVENOUS EVERY 6 HOURS
Status: COMPLETED | OUTPATIENT
Start: 2024-07-08 | End: 2024-07-09

## 2024-07-08 RX ORDER — MISOPROSTOL 100 UG/1
800 TABLET ORAL ONCE AS NEEDED
Status: DISCONTINUED | OUTPATIENT
Start: 2024-07-08 | End: 2024-07-12 | Stop reason: HOSPADM

## 2024-07-08 RX ORDER — OXYCODONE AND ACETAMINOPHEN 5; 325 MG/1; MG/1
1 TABLET ORAL EVERY 6 HOURS PRN
Status: DISCONTINUED | OUTPATIENT
Start: 2024-07-08 | End: 2024-07-12 | Stop reason: HOSPADM

## 2024-07-08 RX ORDER — BUPIVACAINE HYDROCHLORIDE 2.5 MG/ML
INJECTION, SOLUTION EPIDURAL; INFILTRATION; INTRACAUDAL
Status: COMPLETED | OUTPATIENT
Start: 2024-07-08 | End: 2024-07-08

## 2024-07-08 RX ORDER — DEXAMETHASONE SODIUM PHOSPHATE 4 MG/ML
INJECTION, SOLUTION INTRA-ARTICULAR; INTRALESIONAL; INTRAMUSCULAR; INTRAVENOUS; SOFT TISSUE
Status: DISCONTINUED | OUTPATIENT
Start: 2024-07-08 | End: 2024-07-08

## 2024-07-08 RX ORDER — BISACODYL 10 MG/1
10 SUPPOSITORY RECTAL ONCE AS NEEDED
Status: DISCONTINUED | OUTPATIENT
Start: 2024-07-08 | End: 2024-07-12 | Stop reason: HOSPADM

## 2024-07-08 RX ORDER — CARBOPROST TROMETHAMINE 250 UG/ML
250 INJECTION, SOLUTION INTRAMUSCULAR
Status: DISCONTINUED | OUTPATIENT
Start: 2024-07-08 | End: 2024-07-08

## 2024-07-08 RX ORDER — OXYTOCIN-SODIUM CHLORIDE 0.9% IV SOLN 30 UNIT/500ML 30-0.9/5 UT/ML-%
95 SOLUTION INTRAVENOUS ONCE AS NEEDED
Status: DISCONTINUED | OUTPATIENT
Start: 2024-07-08 | End: 2024-07-12 | Stop reason: HOSPADM

## 2024-07-08 RX ORDER — CARBOPROST TROMETHAMINE 250 UG/ML
250 INJECTION, SOLUTION INTRAMUSCULAR
Status: DISCONTINUED | OUTPATIENT
Start: 2024-07-08 | End: 2024-07-12 | Stop reason: HOSPADM

## 2024-07-08 RX ORDER — OXYTOCIN-SODIUM CHLORIDE 0.9% IV SOLN 30 UNIT/500ML 30-0.9/5 UT/ML-%
10 SOLUTION INTRAVENOUS ONCE
Status: DISCONTINUED | OUTPATIENT
Start: 2024-07-08 | End: 2024-07-12 | Stop reason: HOSPADM

## 2024-07-08 RX ORDER — ONDANSETRON HYDROCHLORIDE 2 MG/ML
INJECTION, SOLUTION INTRAVENOUS
Status: DISCONTINUED | OUTPATIENT
Start: 2024-07-08 | End: 2024-07-08

## 2024-07-08 RX ORDER — DIPHENOXYLATE HYDROCHLORIDE AND ATROPINE SULFATE 2.5; .025 MG/1; MG/1
2 TABLET ORAL EVERY 6 HOURS PRN
Status: DISCONTINUED | OUTPATIENT
Start: 2024-07-08 | End: 2024-07-12 | Stop reason: HOSPADM

## 2024-07-08 RX ORDER — FAMOTIDINE 10 MG/ML
20 INJECTION INTRAVENOUS
Status: DISCONTINUED | OUTPATIENT
Start: 2024-07-08 | End: 2024-07-12 | Stop reason: HOSPADM

## 2024-07-08 RX ORDER — METHYLERGONOVINE MALEATE 0.2 MG/ML
200 INJECTION INTRAVENOUS
Status: DISCONTINUED | OUTPATIENT
Start: 2024-07-08 | End: 2024-07-08

## 2024-07-08 RX ORDER — PROCHLORPERAZINE EDISYLATE 5 MG/ML
5 INJECTION INTRAMUSCULAR; INTRAVENOUS EVERY 6 HOURS PRN
Status: DISCONTINUED | OUTPATIENT
Start: 2024-07-08 | End: 2024-07-12 | Stop reason: HOSPADM

## 2024-07-08 RX ORDER — OXYTOCIN/RINGER'S LACTATE 30/500 ML
PLASTIC BAG, INJECTION (ML) INTRAVENOUS CONTINUOUS PRN
Status: DISCONTINUED | OUTPATIENT
Start: 2024-07-08 | End: 2024-07-08

## 2024-07-08 RX ORDER — ADHESIVE BANDAGE
30 BANDAGE TOPICAL 2 TIMES DAILY PRN
Status: DISCONTINUED | OUTPATIENT
Start: 2024-07-09 | End: 2024-07-12 | Stop reason: HOSPADM

## 2024-07-08 RX ORDER — OXYTOCIN-SODIUM CHLORIDE 0.9% IV SOLN 30 UNIT/500ML 30-0.9/5 UT/ML-%
10 SOLUTION INTRAVENOUS ONCE AS NEEDED
Status: DISCONTINUED | OUTPATIENT
Start: 2024-07-08 | End: 2024-07-12 | Stop reason: HOSPADM

## 2024-07-08 RX ORDER — DIPHENHYDRAMINE HCL 25 MG
25 CAPSULE ORAL EVERY 4 HOURS PRN
Status: DISCONTINUED | OUTPATIENT
Start: 2024-07-08 | End: 2024-07-12 | Stop reason: HOSPADM

## 2024-07-08 RX ADMIN — DEXTROSE MONOHYDRATE 2 G: 50 INJECTION, SOLUTION INTRAVENOUS at 12:07

## 2024-07-08 RX ADMIN — SODIUM CHLORIDE, POTASSIUM CHLORIDE, SODIUM LACTATE AND CALCIUM CHLORIDE 1000 ML: 600; 310; 30; 20 INJECTION, SOLUTION INTRAVENOUS at 10:07

## 2024-07-08 RX ADMIN — DEXAMETHASONE SODIUM PHOSPHATE 4 MG: 4 INJECTION, SOLUTION INTRA-ARTICULAR; INTRALESIONAL; INTRAMUSCULAR; INTRAVENOUS; SOFT TISSUE at 12:07

## 2024-07-08 RX ADMIN — BUPIVACAINE HYDROCHLORIDE 1.7 ML: 2.5 INJECTION, SOLUTION EPIDURAL; INFILTRATION; INTRACAUDAL; PERINEURAL at 12:07

## 2024-07-08 RX ADMIN — PHENYLEPHRINE HYDROCHLORIDE 100 MCG: 10 INJECTION INTRAVENOUS at 12:07

## 2024-07-08 RX ADMIN — FENTANYL CITRATE 10 MCG: 50 INJECTION, SOLUTION INTRAMUSCULAR; INTRAVENOUS at 12:07

## 2024-07-08 RX ADMIN — SODIUM CHLORIDE, POTASSIUM CHLORIDE, SODIUM LACTATE AND CALCIUM CHLORIDE: 600; 310; 30; 20 INJECTION, SOLUTION INTRAVENOUS at 10:07

## 2024-07-08 RX ADMIN — KETOROLAC TROMETHAMINE 30 MG: 30 INJECTION, SOLUTION INTRAMUSCULAR; INTRAVENOUS at 12:07

## 2024-07-08 RX ADMIN — HYDROMORPHONE HYDROCHLORIDE 1 MG: 2 INJECTION INTRAMUSCULAR; INTRAVENOUS; SUBCUTANEOUS at 03:07

## 2024-07-08 RX ADMIN — DIPHENHYDRAMINE HYDROCHLORIDE 25 MG: 50 INJECTION INTRAMUSCULAR; INTRAVENOUS at 04:07

## 2024-07-08 RX ADMIN — KETOROLAC TROMETHAMINE 30 MG: 30 INJECTION, SOLUTION INTRAMUSCULAR at 05:07

## 2024-07-08 RX ADMIN — ACETAMINOPHEN 1000 MG: 10 INJECTION, SOLUTION INTRAVENOUS at 12:07

## 2024-07-08 RX ADMIN — SODIUM CHLORIDE, POTASSIUM CHLORIDE, SODIUM LACTATE AND CALCIUM CHLORIDE: 600; 310; 30; 20 INJECTION, SOLUTION INTRAVENOUS at 12:07

## 2024-07-08 RX ADMIN — MORPHINE SULFATE 0.1 MG: 1 INJECTION, SOLUTION EPIDURAL; INTRATHECAL; INTRAVENOUS at 12:07

## 2024-07-08 RX ADMIN — SODIUM CITRATE AND CITRIC ACID MONOHYDRATE 30 ML: 500; 334 SOLUTION ORAL at 10:07

## 2024-07-08 RX ADMIN — ONDANSETRON 8 MG: 2 INJECTION INTRAMUSCULAR; INTRAVENOUS at 12:07

## 2024-07-08 RX ADMIN — Medication 500 ML/HR: at 12:07

## 2024-07-08 RX ADMIN — Medication 95 MILLI-UNITS/MIN: at 01:07

## 2024-07-08 NOTE — PLAN OF CARE
Problem: Adult Inpatient Plan of Care  Goal: Plan of Care Review  Outcome: Progressing  Goal: Patient-Specific Goal (Individualized)  Outcome: Progressing  Goal: Absence of Hospital-Acquired Illness or Injury  Outcome: Progressing  Goal: Optimal Comfort and Wellbeing  Outcome: Progressing  Goal: Readiness for Transition of Care  Outcome: Progressing     Problem: Bariatric Environmental Safety  Goal: Safety Maintained with Care  Outcome: Progressing     Problem: Infection  Goal: Absence of Infection Signs and Symptoms  Outcome: Progressing     Problem:  Fall Injury Risk  Goal: Absence of Fall, Infant Drop and Related Injury  Outcome: Progressing

## 2024-07-08 NOTE — H&P
30yo  with iup 38wk3d, with elevated bps for repeat cs.  Refer to ob flow sheet for hx    144/96  resp-18  pulse--67  af    Pe-wnl        A) iup 38wk5d  Elevated bp  Hx of cs      P)  repeat cs today

## 2024-07-08 NOTE — ANESTHESIA PROCEDURE NOTES
Spinal    Diagnosis: Repeat C/S  Patient location during procedure: OB  Start time: 7/8/2024 12:00 PM  Timeout: 7/8/2024 11:59 AM  End time: 7/8/2024 12:13 PM    Staffing  Authorizing Provider: Shruthi Juares MD  Performing Provider: Shruthi Juares MD    Staffing  Performed by: Shruthi Juares MD  Authorized by: Shruthi Juares MD    Preanesthetic Checklist  Completed: patient identified, IV checked, site marked, risks and benefits discussed, surgical consent, monitors and equipment checked, pre-op evaluation and timeout performed  Spinal Block  Patient position: sitting  Prep: ChloraPrep  Patient monitoring: heart rate, cardiac monitor, continuous pulse ox and frequent blood pressure checks  Approach: midline  Location: L3-4  Injection technique: single shot  CSF Fluid: clear free-flowing CSF  Needle  Needle type: pencil-tip   Needle gauge: 25 G  Needle length: 3.5 in  Additional Documentation: incremental injection  Needle localization: anatomical landmarks  Assessment  Sensory level: T6   Dermatomal levels determined by alcohol wipe  Ease of block: easy  Patient's tolerance of the procedure: comfortable throughout block and no complaints  Medications:    Medications: bupivacaine (pf) (MARCAINE) injection 0.25% - Intraspinal   1.7 mL - 7/8/2024 12:13:00 PM          
normal...

## 2024-07-08 NOTE — ANESTHESIA PREPROCEDURE EVALUATION
2024  Latonya Juares is a 29 y.o., female with   -------------------------------------    Depression       And   ----------------------------     section    Cholecystectomy    Placement-stent    left leg       Presents from clinic for repeat C/S      Pre-op Assessment    I have reviewed the NPO Status.      Review of Systems  Psych:  Psychiatric History                  Physical Exam  General: Well nourished, Cooperative, Alert and Oriented    Airway:  Mallampati: III   Mouth Opening: Normal  TM Distance: Normal  Tongue: Normal  Neck ROM: Normal ROM    Dental:  Intact    Chest/Lungs:  Clear to auscultation, Normal Respiratory Rate    Heart:  Rate: Normal  Rhythm: Regular Rhythm    Abdomen:  gravid     Latest Reference Range & Units 24 10:34   Hemoglobin 12.0 - 16.0 g/dL 11.1 (L)   Hematocrit 37.0 - 47.0 % 35.7 (L)   (L): Data is abnormally low    Anesthesia Plan  Type of Anesthesia, risks & benefits discussed:    Anesthesia Type: Spinal  Intra-op Monitoring Plan: Standard ASA Monitors  Post Op Pain Control Plan: intrathecal opioid  Informed Consent: Informed consent signed with the Patient and all parties understand the risks and agree with anesthesia plan.  All questions answered. Patient consented to blood products? Yes  ASA Score: 3  Day of Surgery Review of History & Physical: H&P Update referred to the surgeon/provider.  Anesthesia Plan Notes: LR Bolus prior to spinal block.  Plan for antiemetics during spinal placement and IV ofirmev after fetal delivery.    Ready For Surgery From Anesthesia Perspective.     .

## 2024-07-08 NOTE — TRANSFER OF CARE
"Anesthesia Transfer of Care Note    Patient: Latonya Juares    Procedure(s) Performed: Procedure(s) (LRB):   SECTION (N/A)    Patient location: Labor and Delivery    Anesthesia Type: spinal    Transport from OR: Transported from OR on room air with adequate spontaneous ventilation    Post pain: adequate analgesia    Post assessment: no apparent anesthetic complications and tolerated procedure well    Post vital signs: stable    Level of consciousness: awake, alert and oriented    Nausea/Vomiting: no nausea/vomiting    Complications: none    Transfer of care protocol was followed    Last vitals: Visit Vitals  /74 (BP Location: Right arm, Patient Position: Lying)   Pulse 79   Temp 37.3 °C (99.1 °F)   Resp 18   Ht 5' 7" (1.702 m)   Wt 127.5 kg (281 lb)   SpO2 100%   Breastfeeding No   BMI 44.01 kg/m²     "

## 2024-07-09 PROBLEM — Z34.90 TERM PREGNANCY, REPEAT: Status: ACTIVE | Noted: 2024-07-09

## 2024-07-09 LAB
BASOPHILS # BLD AUTO: 0.03 X10(3)/MCL
BASOPHILS NFR BLD AUTO: 0.2 %
EOSINOPHIL # BLD AUTO: 0.09 X10(3)/MCL (ref 0–0.9)
EOSINOPHIL NFR BLD AUTO: 0.6 %
ERYTHROCYTE [DISTWIDTH] IN BLOOD BY AUTOMATED COUNT: 14.6 % (ref 11.5–17)
HCT VFR BLD AUTO: 29 % (ref 37–47)
HGB BLD-MCNC: 9.6 G/DL (ref 12–16)
IMM GRANULOCYTES # BLD AUTO: 0.13 X10(3)/MCL (ref 0–0.04)
IMM GRANULOCYTES NFR BLD AUTO: 0.9 %
LYMPHOCYTES # BLD AUTO: 2.57 X10(3)/MCL (ref 0.6–4.6)
LYMPHOCYTES NFR BLD AUTO: 17.8 %
MCH RBC QN AUTO: 27.7 PG (ref 27–31)
MCHC RBC AUTO-ENTMCNC: 33.1 G/DL (ref 33–36)
MCV RBC AUTO: 83.8 FL (ref 80–94)
MONOCYTES # BLD AUTO: 1.06 X10(3)/MCL (ref 0.1–1.3)
MONOCYTES NFR BLD AUTO: 7.4 %
NEUTROPHILS # BLD AUTO: 10.53 X10(3)/MCL (ref 2.1–9.2)
NEUTROPHILS NFR BLD AUTO: 73.1 %
NRBC BLD AUTO-RTO: 0 %
PLATELET # BLD AUTO: 288 X10(3)/MCL (ref 130–400)
PMV BLD AUTO: 9.9 FL (ref 7.4–10.4)
RBC # BLD AUTO: 3.46 X10(6)/MCL (ref 4.2–5.4)
WBC # BLD AUTO: 14.41 X10(3)/MCL (ref 4.5–11.5)

## 2024-07-09 PROCEDURE — 90715 TDAP VACCINE 7 YRS/> IM: CPT | Performed by: OBSTETRICS & GYNECOLOGY

## 2024-07-09 PROCEDURE — 85025 COMPLETE CBC W/AUTO DIFF WBC: CPT | Performed by: OBSTETRICS & GYNECOLOGY

## 2024-07-09 PROCEDURE — 11000001 HC ACUTE MED/SURG PRIVATE ROOM

## 2024-07-09 PROCEDURE — 3E0234Z INTRODUCTION OF SERUM, TOXOID AND VACCINE INTO MUSCLE, PERCUTANEOUS APPROACH: ICD-10-PCS | Performed by: STUDENT IN AN ORGANIZED HEALTH CARE EDUCATION/TRAINING PROGRAM

## 2024-07-09 PROCEDURE — 90471 IMMUNIZATION ADMIN: CPT | Performed by: OBSTETRICS & GYNECOLOGY

## 2024-07-09 PROCEDURE — 25000003 PHARM REV CODE 250: Performed by: OBSTETRICS & GYNECOLOGY

## 2024-07-09 PROCEDURE — 36415 COLL VENOUS BLD VENIPUNCTURE: CPT | Performed by: OBSTETRICS & GYNECOLOGY

## 2024-07-09 PROCEDURE — 63600175 PHARM REV CODE 636 W HCPCS: Performed by: OBSTETRICS & GYNECOLOGY

## 2024-07-09 RX ORDER — OXYCODONE AND ACETAMINOPHEN 10; 325 MG/1; MG/1
1 TABLET ORAL EVERY 6 HOURS PRN
Qty: 28 TABLET | Refills: 0 | Status: SHIPPED | OUTPATIENT
Start: 2024-07-09

## 2024-07-09 RX ADMIN — KETOROLAC TROMETHAMINE 30 MG: 30 INJECTION, SOLUTION INTRAMUSCULAR at 06:07

## 2024-07-09 RX ADMIN — IBUPROFEN 600 MG: 600 TABLET, FILM COATED ORAL at 12:07

## 2024-07-09 RX ADMIN — OXYCODONE HYDROCHLORIDE AND ACETAMINOPHEN 1 TABLET: 10; 325 TABLET ORAL at 09:07

## 2024-07-09 RX ADMIN — KETOROLAC TROMETHAMINE 30 MG: 30 INJECTION, SOLUTION INTRAMUSCULAR at 12:07

## 2024-07-09 RX ADMIN — DOCUSATE SODIUM 200 MG: 100 CAPSULE, LIQUID FILLED ORAL at 08:07

## 2024-07-09 RX ADMIN — TETANUS TOXOID, REDUCED DIPHTHERIA TOXOID AND ACELLULAR PERTUSSIS VACCINE, ADSORBED 0.5 ML: 5; 2.5; 8; 8; 2.5 SUSPENSION INTRAMUSCULAR at 08:07

## 2024-07-09 RX ADMIN — IBUPROFEN 600 MG: 600 TABLET, FILM COATED ORAL at 05:07

## 2024-07-09 RX ADMIN — SIMETHICONE 80 MG: 80 TABLET, CHEWABLE ORAL at 08:07

## 2024-07-09 RX ADMIN — OXYCODONE HYDROCHLORIDE AND ACETAMINOPHEN 1 TABLET: 10; 325 TABLET ORAL at 07:07

## 2024-07-09 RX ADMIN — DOCUSATE SODIUM 200 MG: 100 CAPSULE, LIQUID FILLED ORAL at 09:07

## 2024-07-09 NOTE — OP NOTE
[facility]                       [address]                      [place] [address]    PATIENT NAME:      ANISA MURO  YOB: 1995  CSN:               [id number]  MRN:               [id number]  ADMIT DATE:        2024 10:32:00  PHYSICIAN:         Ian Tate Jr, MD                          OPERATIVE REPORT      DATE OF SURGERY:    2024 18:16:17    SURGEON:  Ian Tate Jr, MD    TITLE OF OPERATION:  Repeat  section.    ASSISTANT:  Ozzy Tate MD    DESCRIPTION OF PROCEDURE:  The patient was taken to the operating room, where   spinal anesthesia was administered and found to be adequate.  Abdomen, perineum,   and vagina were prepped and draped in the usual sterile fashion.  Pfannenstiel   skin incision was made with a knife and carried down through to the level of the   underlying fascia.  Fascial incision was then extended laterally with the   curved Menchaca scissors.  Superior aspect of the rectus fascia was grasped with   Kocher clamps and dissected off the rectus muscles both superiorly and   inferiorly.  Rectus muscles were  in midline.  Peritoneum was   identified and entered sharply.  Peritoneal incision then extended with both   sharp and blunt dissection.  Bladder blade was inserted.  The vesicouterine   peritoneum was identified.  A bladder flap was created digitally.  Uterus was   scored in a low transverse fashion, was carried out laterally with finger   method.  Hand was inserted into the uterus.  The infant's head was delivered   without incident.  Rest of the infant delivered in full.  Cord was clamped and   cut.  Appropriate cord gases and cord blood were obtained.  The infant was   handed off to the [facility] nurses.  The placenta was delivered manually.    Uterus then exteriorized and cleared of all clots and debris.  Hysterotomy site   was closed in 2 layers with a #1 chromic.   Excellent hemostasis noted.  Uterus   returned to the abdomen.  Gutters irrigated, cleared of clots and debris.    Hemostasis was ensured throughout.  Peritoneum was run with a 2-0 chromic.    Rectus muscles reapproximated in the midline.  Fascia was run with a 1 Vicryl,   subcuticular tissue with a 2-0 plain gut.  Skin was closed with an Insorb.  The   patient tolerated the procedure well.    COUNT:  Needle, sponge, lap counts were correct x2.    BLOOD LOSS:  540.        ______________________________  MD LEANNA Dasilva Jr/AQS  DD:  07/09/2024  Time:  07:37AM  DT:  07/09/2024  Time:  07:54AM  Job #:  [id number]      OPERATIVE REPORT

## 2024-07-09 NOTE — PLAN OF CARE
Problem: Breastfeeding  Goal: Effective Breastfeeding  Outcome: Progressing  Intervention: Promote Breast Care and Comfort  Flowsheets (Taken 7/8/2024 1930)  Breast Care: Breastfeeding: open to air  Intervention: Promote Effective Breastfeeding  Flowsheets (Taken 7/8/2024 1930)  Breastfeeding Assistance:   feeding on demand promoted   feeding cue recognition promoted  Parent-Child Attachment Promotion:   cue recognition promoted   positive reinforcement provided   skin-to-skin contact encouraged  Intervention: Support Exclusive Breastfeeding Success  Flowsheets (Taken 7/8/2024 1930)  Supportive Measures:   active listening utilized   counseling provided  Breastfeeding Support:   encouragement provided   lactation counseling provided

## 2024-07-09 NOTE — ANESTHESIA POSTPROCEDURE EVALUATION
Anesthesia Post Evaluation    Patient: Latonya Juares    Procedure(s) Performed: Procedure(s) (LRB):   SECTION (N/A)    Final Anesthesia Type: spinal      Patient location during evaluation: labor & delivery  Patient participation: Yes- Able to Participate  Level of consciousness: awake and alert  Post-procedure vital signs: reviewed and stable  Pain management: adequate  Airway patency: patent    PONV status at discharge: No PONV  Anesthetic complications: no      Cardiovascular status: blood pressure returned to baseline, hemodynamically stable and stable  Respiratory status: unassisted, spontaneous ventilation and room air  Hydration status: euvolemic  Follow-up not needed.  Comments: Spinal regressing and patient able to move legs/wiggle toes: to be discharged from PACU to Mother Baby when Criteria Met              Vitals Value Taken Time   /80 24 0709   Temp 36.7 °C (98 °F) 24 0709   Pulse 79 24 0709   Resp 18 24 1515   SpO2 99 % 24 0709         Event Time   Out of Recovery 14:00:00         Pain/Clifford Score: Pain Rating Prior to Med Admin: 4 (2024  6:19 AM)  Clifford Score: 10 (2024  2:00 PM)

## 2024-07-09 NOTE — PLAN OF CARE
Problem: Breastfeeding  Goal: Effective Breastfeeding  Outcome: Progressing  Intervention: Promote Breast Care and Comfort  Flowsheets (Taken 7/9/2024 1418)  Breast Care: Breastfeeding: lanolin to nipples  Intervention: Promote Effective Breastfeeding  Flowsheets (Taken 7/9/2024 1418)  Breastfeeding Assistance:   assisted with positioning   feeding on demand promoted   feeding session observed   infant latch-on verified   infant suck/swallow verified   support offered  Parent-Child Attachment Promotion:   strengths emphasized   positive reinforcement provided   cue recognition promoted  Intervention: Support Exclusive Breastfeeding Success  Flowsheets (Taken 7/9/2024 1418)  Supportive Measures:   active listening utilized   verbalization of feelings encouraged  Breastfeeding Support:   diary/feeding log utilized   infant-mother separation minimized   encouragement provided   lactation counseling provided   maternal nutrition promoted   maternal hydration promoted   maternal rest encouraged     Lactation assessment completed. Patient voiced potential shallow latch. Small blister noted to right nipple. (Lanolin and gel pads given) assisted with latching baby to left breast in football hold. Pt voices latch is comfortable. Multiple audible swallows. Prefers to hand express from right breast this feed to rest right nipple. Colostrum at bedside in syringe. Educated on milk storage guidelines. Verbalized understanding.  Patient does not have a pump. [employer] website given to patient. She also has wic and said she potentially would call wic today.  Hand pump given

## 2024-07-10 PROCEDURE — 25000003 PHARM REV CODE 250: Performed by: OBSTETRICS & GYNECOLOGY

## 2024-07-10 PROCEDURE — 11000001 HC ACUTE MED/SURG PRIVATE ROOM

## 2024-07-10 RX ADMIN — OXYCODONE HYDROCHLORIDE AND ACETAMINOPHEN 1 TABLET: 10; 325 TABLET ORAL at 04:07

## 2024-07-10 RX ADMIN — SIMETHICONE 80 MG: 80 TABLET, CHEWABLE ORAL at 10:07

## 2024-07-10 RX ADMIN — DOCUSATE SODIUM 200 MG: 100 CAPSULE, LIQUID FILLED ORAL at 09:07

## 2024-07-10 RX ADMIN — IBUPROFEN 600 MG: 600 TABLET, FILM COATED ORAL at 12:07

## 2024-07-10 RX ADMIN — OXYCODONE HYDROCHLORIDE AND ACETAMINOPHEN 1 TABLET: 10; 325 TABLET ORAL at 10:07

## 2024-07-10 RX ADMIN — IBUPROFEN 600 MG: 600 TABLET, FILM COATED ORAL at 06:07

## 2024-07-10 RX ADMIN — IBUPROFEN 600 MG: 600 TABLET, FILM COATED ORAL at 05:07

## 2024-07-10 RX ADMIN — DOCUSATE SODIUM 200 MG: 100 CAPSULE, LIQUID FILLED ORAL at 08:07

## 2024-07-10 RX ADMIN — OXYCODONE HYDROCHLORIDE AND ACETAMINOPHEN 1 TABLET: 10; 325 TABLET ORAL at 02:07

## 2024-07-10 RX ADMIN — OXYCODONE HYDROCHLORIDE AND ACETAMINOPHEN 1 TABLET: 10; 325 TABLET ORAL at 08:07

## 2024-07-10 NOTE — PLAN OF CARE
Problem: Adult Inpatient Plan of Care  Goal: Plan of Care Review  Outcome: Progressing  Goal: Patient-Specific Goal (Individualized)  Outcome: Progressing  Goal: Absence of Hospital-Acquired Illness or Injury  Outcome: Progressing  Goal: Optimal Comfort and Wellbeing  Outcome: Progressing  Goal: Readiness for Transition of Care  Outcome: Progressing     Problem: Bariatric Environmental Safety  Goal: Safety Maintained with Care  Outcome: Progressing     Problem:  Fall Injury Risk  Goal: Absence of Fall, Infant Drop and Related Injury  Outcome: Progressing     Problem: Wound  Goal: Optimal Coping  Outcome: Progressing  Goal: Optimal Functional Ability  Outcome: Progressing  Goal: Absence of Infection Signs and Symptoms  Outcome: Progressing  Goal: Improved Oral Intake  Outcome: Progressing  Goal: Optimal Pain Control and Function  Outcome: Progressing  Goal: Skin Health and Integrity  Outcome: Progressing  Goal: Optimal Wound Healing  Outcome: Progressing     Problem: Postpartum ( Delivery)  Goal: Successful Parent Role Transition  Outcome: Progressing  Goal: Hemostasis  Outcome: Progressing  Goal: Effective Bowel Elimination  Outcome: Progressing  Goal: Fluid and Electrolyte Balance  Outcome: Progressing  Goal: Absence of Infection Signs and Symptoms  Outcome: Progressing  Goal: Anesthesia/Sedation Recovery  Outcome: Progressing  Goal: Optimal Pain Control and Function  Outcome: Progressing  Goal: Nausea and Vomiting Relief  Outcome: Progressing  Goal: Effective Urinary Elimination  Outcome: Progressing  Goal: Effective Oxygenation and Ventilation  Outcome: Progressing     Problem: Breastfeeding  Goal: Effective Breastfeeding  Outcome: Progressing

## 2024-07-10 NOTE — DISCHARGE SUMMARY
Sp cs on 7/8  Doing fine  Pain controlled  Nl bladder function  Desires discharge    Cond-stable  Diet-reg  Act-pelvic rest  Meds-motrin, percocet  Fu-angela 3 wks

## 2024-07-10 NOTE — PLAN OF CARE
Problem: Adult Inpatient Plan of Care  Goal: Plan of Care Review  7/10/2024 0720 by Oriana Darby LPN  Outcome: Progressing  7/10/2024 0717 by Oriana Darby LPN  Outcome: Progressing  Goal: Patient-Specific Goal (Individualized)  7/10/2024 0720 by Oriana Darby LPN  Outcome: Progressing  7/10/2024 0717 by Oriana Darby LPN  Outcome: Progressing  Goal: Absence of Hospital-Acquired Illness or Injury  7/10/2024 0720 by Oriana Darby LPN  Outcome: Progressing  7/10/2024 0717 by Oriana Darby LPN  Outcome: Progressing  Goal: Optimal Comfort and Wellbeing  7/10/2024 0720 by Oriana Darby LPN  Outcome: Progressing  7/10/2024 0717 by Oriana Darby LPN  Outcome: Progressing  Goal: Readiness for Transition of Care  7/10/2024 0720 by Oriana Darby LPN  Outcome: Progressing  7/10/2024 0717 by Oriana Darby LPN  Outcome: Progressing     Problem: Bariatric Environmental Safety  Goal: Safety Maintained with Care  7/10/2024 0720 by Oriana Darby LPN  Outcome: Progressing  7/10/2024 0717 by Oriana Darby LPN  Outcome: Progressing     Problem: Infection  Goal: Absence of Infection Signs and Symptoms  7/10/2024 0720 by Oriana Darby LPN  Outcome: Progressing  7/10/2024 0717 by Oriana Darby LPN  Outcome: Progressing     Problem:  Fall Injury Risk  Goal: Absence of Fall, Infant Drop and Related Injury  7/10/2024 0720 by Oriana Darby LPN  Outcome: Progressing  7/10/2024 0717 by Oriana Darby LPN  Outcome: Progressing     Problem: Wound  Goal: Optimal Coping  7/10/2024 0720 by Oriana Darby LPN  Outcome: Progressing  7/10/2024 0717 by Oriana Darby LPN  Outcome: Progressing  Goal: Optimal Functional Ability  7/10/2024 0720 by Oriana Darby LPN  Outcome: Progressing  7/10/2024 0717 by Oriana Darby LPN  Outcome: Progressing  Goal: Absence of Infection Signs and Symptoms  7/10/2024 0720 by Oriana Darby LPN  Outcome: Progressing  7/10/2024 0717 by Oriana Darby LPN  Outcome: Progressing  Goal: Improved  Oral Intake  7/10/2024 0720 by Oriana Darby LPN  Outcome: Progressing  7/10/2024 0717 by Oriana Darby LPN  Outcome: Progressing  Goal: Optimal Pain Control and Function  7/10/2024 0720 by Oriana Darby LPN  Outcome: Progressing  7/10/2024 0717 by Oriana Darby LPN  Outcome: Progressing  Goal: Skin Health and Integrity  7/10/2024 0720 by Oriana Darby LPN  Outcome: Progressing  7/10/2024 0717 by Oriana Darby LPN  Outcome: Progressing  Goal: Optimal Wound Healing  7/10/2024 0720 by Oriana Darby LPN  Outcome: Progressing  7/10/2024 0717 by Oriana Darby LPN  Outcome: Progressing     Problem: Breastfeeding  Goal: Effective Breastfeeding  7/10/2024 0720 by Oriana Darby LPN  Outcome: Progressing  7/10/2024 0717 by Oriana Darby LPN  Outcome: Progressing     Problem: Postpartum ( Delivery)  Goal: Successful Parent Role Transition  7/10/2024 0720 by Oriana Darby LPN  Outcome: Progressing  7/10/2024 0717 by Oriana Darby LPN  Outcome: Progressing  Goal: Hemostasis  7/10/2024 0720 by Oriana Darby LPN  Outcome: Progressing  7/10/2024 0717 by Oriana Darby LPN  Outcome: Progressing  Goal: Effective Bowel Elimination  7/10/2024 0720 by Oriana Darby LPN  Outcome: Progressing  7/10/2024 0717 by Oriana Darby LPN  Outcome: Progressing  Goal: Fluid and Electrolyte Balance  7/10/2024 0720 by Oriana Darby LPN  Outcome: Progressing  7/10/2024 0717 by Oriana Darby LPN  Outcome: Progressing  Goal: Absence of Infection Signs and Symptoms  7/10/2024 0720 by Oriana Darby LPN  Outcome: Progressing  7/10/2024 0717 by Oriana Darby LPN  Outcome: Progressing  Goal: Anesthesia/Sedation Recovery  7/10/2024 0720 by Oriana Darby LPN  Outcome: Progressing  7/10/2024 0717 by Oriana Darby LPN  Outcome: Progressing  Goal: Optimal Pain Control and Function  7/10/2024 07 by Oriana Darby LPN  Outcome: Progressing  7/10/2024 0717 by Oriana Darby LPN  Outcome: Progressing  Goal: Nausea and Vomiting  Relief  7/10/2024 0720 by Oriana Darby LPN  Outcome: Progressing  7/10/2024 0717 by Oriana Darby LPN  Outcome: Progressing  Goal: Effective Urinary Elimination  7/10/2024 0720 by Oriana Darby LPN  Outcome: Progressing  7/10/2024 0717 by Oriana Darby LPN  Outcome: Progressing  Goal: Effective Oxygenation and Ventilation  7/10/2024 0720 by Oriana Darby LPN  Outcome: Progressing  7/10/2024 0717 by Oriana Darby LPN  Outcome: Progressing

## 2024-07-10 NOTE — LACTATION NOTE
This note was copied from a baby's chart.  Met with patient for lactation assessment. Reports that feedings are going well. Patient feels breast changes today, heavier and colin. Using hand pump on right side d/t soreness. Small blister intact noted. Lanolin at bedside and being used. Also has gel pads and utilizing those as well. Getting approx 5-8 ml and feeding baby that to start out. Getting circumcision today. Patient will call nurse for next feeding.

## 2024-07-10 NOTE — PLAN OF CARE
Problem: Adult Inpatient Plan of Care  Goal: Plan of Care Review  Outcome: Progressing  Goal: Patient-Specific Goal (Individualized)  Outcome: Progressing  Goal: Absence of Hospital-Acquired Illness or Injury  Outcome: Progressing  Goal: Optimal Comfort and Wellbeing  Outcome: Progressing  Goal: Readiness for Transition of Care  Outcome: Progressing     Problem: Bariatric Environmental Safety  Goal: Safety Maintained with Care  Outcome: Progressing     Problem: Infection  Goal: Absence of Infection Signs and Symptoms  Outcome: Progressing     Problem:  Fall Injury Risk  Goal: Absence of Fall, Infant Drop and Related Injury  Outcome: Progressing     Problem: Wound  Goal: Optimal Coping  Outcome: Progressing  Goal: Optimal Functional Ability  Outcome: Progressing  Goal: Absence of Infection Signs and Symptoms  Outcome: Progressing  Goal: Improved Oral Intake  Outcome: Progressing  Goal: Optimal Pain Control and Function  Outcome: Progressing  Goal: Skin Health and Integrity  Outcome: Progressing  Goal: Optimal Wound Healing  Outcome: Progressing     Problem: Breastfeeding  Goal: Effective Breastfeeding  Outcome: Progressing     Problem: Postpartum ( Delivery)  Goal: Successful Parent Role Transition  Outcome: Progressing  Goal: Hemostasis  Outcome: Progressing  Goal: Effective Bowel Elimination  Outcome: Progressing  Goal: Fluid and Electrolyte Balance  Outcome: Progressing  Goal: Absence of Infection Signs and Symptoms  Outcome: Progressing  Goal: Anesthesia/Sedation Recovery  Outcome: Progressing  Goal: Optimal Pain Control and Function  Outcome: Progressing  Goal: Nausea and Vomiting Relief  Outcome: Progressing  Goal: Effective Urinary Elimination  Outcome: Progressing  Goal: Effective Oxygenation and Ventilation  Outcome: Progressing

## 2024-07-11 PROCEDURE — 11000001 HC ACUTE MED/SURG PRIVATE ROOM

## 2024-07-11 PROCEDURE — 25000003 PHARM REV CODE 250: Performed by: OBSTETRICS & GYNECOLOGY

## 2024-07-11 RX ADMIN — OXYCODONE HYDROCHLORIDE AND ACETAMINOPHEN 1 TABLET: 5; 325 TABLET ORAL at 04:07

## 2024-07-11 RX ADMIN — OXYCODONE HYDROCHLORIDE AND ACETAMINOPHEN 1 TABLET: 10; 325 TABLET ORAL at 03:07

## 2024-07-11 RX ADMIN — IBUPROFEN 600 MG: 600 TABLET, FILM COATED ORAL at 08:07

## 2024-07-11 RX ADMIN — DOCUSATE SODIUM 200 MG: 100 CAPSULE, LIQUID FILLED ORAL at 08:07

## 2024-07-11 RX ADMIN — IBUPROFEN 600 MG: 600 TABLET, FILM COATED ORAL at 12:07

## 2024-07-11 RX ADMIN — IBUPROFEN 600 MG: 600 TABLET, FILM COATED ORAL at 06:07

## 2024-07-11 RX ADMIN — DOCUSATE SODIUM 200 MG: 100 CAPSULE, LIQUID FILLED ORAL at 09:07

## 2024-07-11 NOTE — PLAN OF CARE
Problem: Adult Inpatient Plan of Care  Goal: Plan of Care Review  7/11/2024 0905 by Chanda Guardado LPN  Outcome: Progressing  7/11/2024 0856 by Chanda Guardado LPN  Outcome: Progressing  Goal: Patient-Specific Goal (Individualized)  7/11/2024 0905 by Chanda Guardado LPN  Outcome: Progressing  7/11/2024 0856 by Chanda Guardado LPN  Outcome: Progressing  Goal: Absence of Hospital-Acquired Illness or Injury  7/11/2024 0905 by Chanda Guardado LPN  Outcome: Progressing  7/11/2024 0856 by Chanda Guardado LPN  Outcome: Progressing  Goal: Optimal Comfort and Wellbeing  7/11/2024 0905 by Chanda Guardado LPN  Outcome: Progressing  7/11/2024 0856 by Chanda Guardado LPN  Outcome: Progressing  Goal: Readiness for Transition of Care  7/11/2024 0905 by Chanda Guardado LPN  Outcome: Progressing  7/11/2024 0856 by Chanda Guardado LPN  Outcome: Progressing

## 2024-07-11 NOTE — LACTATION NOTE
This note was copied from a baby's chart.  Reports breast changes. Milk is coming in. Feedings are going well, according to mom. Denies pain or discomfort with nursing at this time. Hand pump at bedside. Audible swallows, per mom. Reports right nipple healed well.

## 2024-07-12 VITALS
RESPIRATION RATE: 18 BRPM | WEIGHT: 281 LBS | TEMPERATURE: 98 F | HEART RATE: 91 BPM | DIASTOLIC BLOOD PRESSURE: 79 MMHG | BODY MASS INDEX: 44.1 KG/M2 | OXYGEN SATURATION: 97 % | HEIGHT: 67 IN | SYSTOLIC BLOOD PRESSURE: 132 MMHG

## 2024-07-12 PROCEDURE — 25000003 PHARM REV CODE 250: Performed by: OBSTETRICS & GYNECOLOGY

## 2024-07-12 RX ADMIN — IBUPROFEN 600 MG: 600 TABLET, FILM COATED ORAL at 10:07

## 2024-07-12 RX ADMIN — IBUPROFEN 600 MG: 600 TABLET, FILM COATED ORAL at 04:07

## 2024-07-12 RX ADMIN — OXYCODONE HYDROCHLORIDE AND ACETAMINOPHEN 1 TABLET: 10; 325 TABLET ORAL at 12:07

## 2024-07-12 RX ADMIN — DOCUSATE SODIUM 200 MG: 100 CAPSULE, LIQUID FILLED ORAL at 08:07

## 2024-07-12 NOTE — PLAN OF CARE
Problem: Adult Inpatient Plan of Care  Goal: Plan of Care Review  Outcome: Progressing  Goal: Patient-Specific Goal (Individualized)  Outcome: Progressing  Goal: Absence of Hospital-Acquired Illness or Injury  Outcome: Progressing  Goal: Optimal Comfort and Wellbeing  Outcome: Progressing  Goal: Readiness for Transition of Care  Outcome: Progressing     Problem: Bariatric Environmental Safety  Goal: Safety Maintained with Care  Outcome: Progressing     Problem: Infection  Goal: Absence of Infection Signs and Symptoms  Outcome: Progressing     Problem:  Fall Injury Risk  Goal: Absence of Fall, Infant Drop and Related Injury  Outcome: Progressing     Problem: Wound  Goal: Optimal Coping  Outcome: Progressing  Goal: Optimal Functional Ability  Outcome: Progressing  Goal: Absence of Infection Signs and Symptoms  Outcome: Progressing  Goal: Improved Oral Intake  Outcome: Progressing  Goal: Optimal Pain Control and Function  Outcome: Progressing  Goal: Skin Health and Integrity  Outcome: Progressing  Goal: Optimal Wound Healing  Outcome: Progressing     Problem: Breastfeeding  Goal: Effective Breastfeeding  Outcome: Progressing     Problem: Postpartum ( Delivery)  Goal: Successful Parent Role Transition  Outcome: Progressing  Goal: Hemostasis  Outcome: Progressing  Goal: Effective Bowel Elimination  Outcome: Progressing  Goal: Fluid and Electrolyte Balance  Outcome: Progressing  Goal: Absence of Infection Signs and Symptoms  Outcome: Progressing  Goal: Anesthesia/Sedation Recovery  Outcome: Progressing  Goal: Optimal Pain Control and Function  Outcome: Progressing  Goal: Nausea and Vomiting Relief  Outcome: Progressing  Goal: Effective Urinary Elimination  Outcome: Progressing  Goal: Effective Oxygenation and Ventilation  Outcome: Progressing     Problem: Skin Injury Risk Increased  Goal: Skin Health and Integrity  Outcome: Progressing

## 2024-07-12 NOTE — DISCHARGE SUMMARY
"Delivery Discharge Summary  Obstetrics      Primary OB Clinician: Ian Tate Jr., MD    Discharge Provider: Gonzalo Green MD    Admission date: 2024  Discharge date: 2024    Admit Dx:  Patient Active Problem List   Diagnosis    Term pregnancy, repeat        Discharge Dx:    Same    Procedure:  delivery    Hospital Course:  Latonya Juares is a 29 y.o. now  who was admitted on 2024 for delivery. Patient delivered a viable . Please see delivery note for further details. Pt was in stable condition post delivery and was transferred to the Mother-Baby Unit. Her postpartum course was uncomplicated. On the date of discharge, patient's pain is controlled with oral pain medications. She is tolerating ambulation without SOB or CP, and PO diet without N/V. Reported lochia is within the normal range. Pt in stable condition and ready for discharge.     Incision clean, well approximated. Approx 2cm on the R aspect has some mild oozing c/w insorb reapproximation.     Pertinent studies:  Postpartum CBC  Lab Results   Component Value Date    WBC 14.41 (H) 2024    HGB 9.6 (L) 2024    HCT 29.0 (L) 2024    MCV 83.8 2024     2024       Delivery:    Episiotomy:     Lacerations:     Repair suture:     Repair # of packets:     Blood loss (ml):       Birth information:  YOB: 2025   Time of birth: 12:32 PM   Sex: male   Delivery type: , Low Transverse   Gestational Age: 38w3d     Measurements    Weight: 3090 g  Weight (lbs): 6 lb 13 oz  Length: 49.5 cm  Length (in): 19.5"  Head circumference: 33 cm         Delivery Clinician: Delivery Providers    Delivering clinician: Ian Tate Jr., MD   Provider Role    Judy Mallory RN Registered Nurse             Additional  information:  Forceps:    Vacuum:    Breech:    Observed anomalies      Living?:     Apgars    Living status: Living  Apgar Component Scores:  1 min.:  5 min.:  10 min.:  " 15 min.:  20 min.:    Skin color:  0  1       Heart rate:  2  2       Reflex irritability:  2  2       Muscle tone:  2  2       Respiratory effort:  2  2       Total:  8  9       Apgars assigned by: ADÁN THOMAS         Placenta: Delivered:       appearance    Disposition: To home, self care    Follow Up: 1-2 weeks    Patient Instructions:   1. Call the office for any bleeding >2 pads/hour for >2 hours, temperature >100.4, pain that is uncontrolled with medications, or for any other concerns.  2. Pelvic rest and no tub baths x 6 weeks.

## 2024-09-03 ENCOUNTER — HOSPITAL ENCOUNTER (EMERGENCY)
Facility: HOSPITAL | Age: 29
Discharge: HOME | End: 2024-09-03
Attending: STUDENT IN AN ORGANIZED HEALTH CARE EDUCATION/TRAINING PROGRAM
Payer: MEDICAID

## 2024-09-03 VITALS
WEIGHT: 265 LBS | DIASTOLIC BLOOD PRESSURE: 83 MMHG | OXYGEN SATURATION: 99 % | SYSTOLIC BLOOD PRESSURE: 141 MMHG | HEIGHT: 67 IN | RESPIRATION RATE: 20 BRPM | HEART RATE: 73 BPM | BODY MASS INDEX: 41.59 KG/M2 | TEMPERATURE: 98 F

## 2024-09-03 DIAGNOSIS — F44.5 PSYCHOGENIC NONEPILEPTIC SEIZURE: Primary | ICD-10-CM

## 2024-09-03 LAB
ALBUMIN SERPL-MCNC: 3.7 G/DL (ref 3.5–5)
ALBUMIN/GLOB SERPL: 1.1 RATIO (ref 1.1–2)
ALP SERPL-CCNC: 75 UNIT/L (ref 40–150)
ALT SERPL-CCNC: 47 UNIT/L (ref 0–55)
ANION GAP SERPL CALC-SCNC: 8 MEQ/L
AST SERPL-CCNC: 26 UNIT/L (ref 5–34)
B-HCG UR QL: NEGATIVE
BACTERIA #/AREA URNS AUTO: ABNORMAL /HPF
BASOPHILS # BLD AUTO: 0.03 X10(3)/MCL
BASOPHILS NFR BLD AUTO: 0.3 %
BILIRUB SERPL-MCNC: 0.5 MG/DL
BILIRUB UR QL STRIP.AUTO: NEGATIVE
BUN SERPL-MCNC: 11.6 MG/DL (ref 7–18.7)
CALCIUM SERPL-MCNC: 9.3 MG/DL (ref 8.4–10.2)
CHLORIDE SERPL-SCNC: 107 MMOL/L (ref 98–107)
CLARITY UR: CLEAR
CO2 SERPL-SCNC: 26 MMOL/L (ref 22–29)
COLOR UR AUTO: YELLOW
CREAT SERPL-MCNC: 0.78 MG/DL (ref 0.55–1.02)
CREAT/UREA NIT SERPL: 15
CTP QC/QA: YES
EOSINOPHIL # BLD AUTO: 0.12 X10(3)/MCL (ref 0–0.9)
EOSINOPHIL NFR BLD AUTO: 1 %
ERYTHROCYTE [DISTWIDTH] IN BLOOD BY AUTOMATED COUNT: 13.3 % (ref 11.5–17)
GFR SERPLBLD CREATININE-BSD FMLA CKD-EPI: >60 ML/MIN/1.73/M2
GLOBULIN SER-MCNC: 3.5 GM/DL (ref 2.4–3.5)
GLUCOSE SERPL-MCNC: 93 MG/DL (ref 74–100)
GLUCOSE UR QL STRIP: NORMAL
HCT VFR BLD AUTO: 35.4 % (ref 37–47)
HGB BLD-MCNC: 11.3 G/DL (ref 12–16)
HGB UR QL STRIP: NEGATIVE
HOLD SPECIMEN: NORMAL
HYALINE CASTS #/AREA URNS LPF: ABNORMAL /LPF
IMM GRANULOCYTES # BLD AUTO: 0.04 X10(3)/MCL (ref 0–0.04)
IMM GRANULOCYTES NFR BLD AUTO: 0.3 %
KETONES UR QL STRIP: NEGATIVE
LEUKOCYTE ESTERASE UR QL STRIP: 500
LYMPHOCYTES # BLD AUTO: 2.38 X10(3)/MCL (ref 0.6–4.6)
LYMPHOCYTES NFR BLD AUTO: 20.7 %
MAGNESIUM SERPL-MCNC: 1.8 MG/DL (ref 1.6–2.6)
MCH RBC QN AUTO: 26.2 PG (ref 27–31)
MCHC RBC AUTO-ENTMCNC: 31.9 G/DL (ref 33–36)
MCV RBC AUTO: 82.1 FL (ref 80–94)
MONOCYTES # BLD AUTO: 0.66 X10(3)/MCL (ref 0.1–1.3)
MONOCYTES NFR BLD AUTO: 5.7 %
MUCOUS THREADS URNS QL MICRO: ABNORMAL /LPF
NEUTROPHILS # BLD AUTO: 8.29 X10(3)/MCL (ref 2.1–9.2)
NEUTROPHILS NFR BLD AUTO: 72 %
NITRITE UR QL STRIP: NEGATIVE
NRBC BLD AUTO-RTO: 0 %
OHS QRS DURATION: 88 MS
OHS QTC CALCULATION: 446 MS
PH UR STRIP: 5.5 [PH]
PLATELET # BLD AUTO: 368 X10(3)/MCL (ref 130–400)
PMV BLD AUTO: 9.3 FL (ref 7.4–10.4)
POTASSIUM SERPL-SCNC: 4 MMOL/L (ref 3.5–5.1)
PROT SERPL-MCNC: 7.2 GM/DL (ref 6.4–8.3)
PROT UR QL STRIP: ABNORMAL
RBC # BLD AUTO: 4.31 X10(6)/MCL (ref 4.2–5.4)
RBC #/AREA URNS AUTO: ABNORMAL /HPF
SODIUM SERPL-SCNC: 141 MMOL/L (ref 136–145)
SP GR UR STRIP.AUTO: 1.03 (ref 1–1.03)
SQUAMOUS #/AREA URNS LPF: ABNORMAL /HPF
UROBILINOGEN UR STRIP-ACNC: ABNORMAL
WBC # BLD AUTO: 11.52 X10(3)/MCL (ref 4.5–11.5)
WBC #/AREA URNS AUTO: ABNORMAL /HPF

## 2024-09-03 PROCEDURE — 25000003 PHARM REV CODE 250: Performed by: STUDENT IN AN ORGANIZED HEALTH CARE EDUCATION/TRAINING PROGRAM

## 2024-09-03 PROCEDURE — 81025 URINE PREGNANCY TEST: CPT | Performed by: STUDENT IN AN ORGANIZED HEALTH CARE EDUCATION/TRAINING PROGRAM

## 2024-09-03 PROCEDURE — 81001 URINALYSIS AUTO W/SCOPE: CPT | Performed by: STUDENT IN AN ORGANIZED HEALTH CARE EDUCATION/TRAINING PROGRAM

## 2024-09-03 PROCEDURE — 96360 HYDRATION IV INFUSION INIT: CPT

## 2024-09-03 PROCEDURE — 85025 COMPLETE CBC W/AUTO DIFF WBC: CPT | Performed by: STUDENT IN AN ORGANIZED HEALTH CARE EDUCATION/TRAINING PROGRAM

## 2024-09-03 PROCEDURE — 87086 URINE CULTURE/COLONY COUNT: CPT | Performed by: STUDENT IN AN ORGANIZED HEALTH CARE EDUCATION/TRAINING PROGRAM

## 2024-09-03 PROCEDURE — 80053 COMPREHEN METABOLIC PANEL: CPT | Performed by: STUDENT IN AN ORGANIZED HEALTH CARE EDUCATION/TRAINING PROGRAM

## 2024-09-03 PROCEDURE — 83735 ASSAY OF MAGNESIUM: CPT | Performed by: STUDENT IN AN ORGANIZED HEALTH CARE EDUCATION/TRAINING PROGRAM

## 2024-09-03 PROCEDURE — 93005 ELECTROCARDIOGRAM TRACING: CPT

## 2024-09-03 PROCEDURE — 99285 EMERGENCY DEPT VISIT HI MDM: CPT | Mod: 25

## 2024-09-03 RX ORDER — LANOLIN ALCOHOL/MO/W.PET/CERES
400 CREAM (GRAM) TOPICAL DAILY
Qty: 30 TABLET | Refills: 0 | Status: SHIPPED | OUTPATIENT
Start: 2024-09-03

## 2024-09-03 RX ADMIN — SODIUM CHLORIDE 1000 ML: 9 INJECTION, SOLUTION INTRAVENOUS at 03:09

## 2024-09-03 NOTE — Clinical Note
"Latonya Nixon (Britney)ussard was seen and treated in our emergency department on 9/3/2024.  She may return to work on 09/05/2024.       If you have any questions or concerns, please don't hesitate to call.      BULL Silverman RN    "

## 2024-09-03 NOTE — ED PROVIDER NOTES
"Encounter Date: 9/3/2024       History     Chief Complaint   Patient presents with    Seizures     Patient reports to the ed (via ems) secondary to possible seizure activity approximately 20 minutes. Patient states while in class at the , she got "hot" and remembers waking up inside of an ambulance. Patient awake and alert upon arrival. Jpk=262 mg/dl per ems. Cheryl rodrigez     Patient presents to the emergency department due to a possible seizure.  She reports she was working today at  when she was started to feel hot.  She stood up to go walk outside and then she passed out.  She reports a history of a previous pseudo-seizure, she has been evaluated by a neurologist, had an MRI and EEG, also was worked up a cardiologist, and the diagnosis was stress-induced seizures.  She states she has not had a seizure in the last 2 years.  She recently gave birth via  8 weeks ago and had just gone back to work about 2 weeks ago.    The history is provided by the patient.     Review of patient's allergies indicates:  No Known Allergies  Past Medical History:   Diagnosis Date    Depression      Past Surgical History:   Procedure Laterality Date     SECTION       SECTION N/A 2024    Procedure:  SECTION;  Surgeon: Ian Tate Jr., MD;  Location: [facility] beds [facility]  Service: OB/GYN;  Laterality: N/A;    CHOLECYSTECTOMY      PLACEMENT-STENT Left     left leg     No family history on file.  Social History     Tobacco Use    Smoking status: Never    Smokeless tobacco: Never   Substance Use Topics    Alcohol use: Not Currently    Drug use: Never     Review of Systems   Constitutional:  Negative for chills and fever.   HENT:  Negative for congestion and sore throat.    Respiratory:  Negative for cough and shortness of breath.    Cardiovascular:  Negative for chest pain and palpitations.   Gastrointestinal:  Negative for abdominal pain and nausea.   Genitourinary:  Negative for dysuria and " hematuria.   Musculoskeletal:  Negative for arthralgias and myalgias.   Neurological:  Negative for dizziness and weakness.       Physical Exam     Initial Vitals [09/03/24 1520]   BP Pulse Resp Temp SpO2   (!) 141/85 85 18 98.4 °F (36.9 °C) 99 %      MAP       --         Physical Exam    Nursing note and vitals reviewed.  Constitutional: She appears well-developed and well-nourished.   HENT:   Head: Normocephalic and atraumatic.   Eyes: EOM are normal. Pupils are equal, round, and reactive to light.   Neck: Neck supple.   Normal range of motion.  Cardiovascular:  Normal rate, regular rhythm and normal heart sounds.           Pulmonary/Chest: Breath sounds normal. No respiratory distress.   Abdominal: Abdomen is soft. There is no abdominal tenderness.   Musculoskeletal:         General: No edema. Normal range of motion.      Cervical back: Normal range of motion and neck supple.     Neurological: She is alert and oriented to person, place, and time. She has normal strength. No cranial nerve deficit or sensory deficit.   Skin: Skin is warm and dry.         ED Course   Procedures  Labs Reviewed   URINALYSIS, REFLEX TO URINE CULTURE - Abnormal       Result Value    Color, UA Yellow      Appearance, UA Clear      Specific Gravity, UA 1.030      pH, UA 5.5      Protein, UA Trace (*)     Glucose, UA Normal      Ketones, UA Negative      Blood, UA Negative      Bilirubin, UA Negative      Urobilinogen, UA 1+ (*)     Nitrites, UA Negative      Leukocyte Esterase,  (*)     RBC, UA 6-10 (*)     WBC, UA 21-50 (*)     Bacteria, UA None Seen      Squamous Epithelial Cells, UA Occ (*)     Mucous, UA Few (*)     Hyaline Casts, UA None Seen     CBC WITH DIFFERENTIAL - Abnormal    WBC 11.52 (*)     RBC 4.31      Hgb 11.3 (*)     Hct 35.4 (*)     MCV 82.1      MCH 26.2 (*)     MCHC 31.9 (*)     RDW 13.3      Platelet 368      MPV 9.3      Neut % 72.0      Lymph % 20.7      Mono % 5.7      Eos % 1.0      Basophil % 0.3       Lymph # 2.38      Neut # 8.29      Mono # 0.66      Eos # 0.12      Baso # 0.03      IG# 0.04      IG% 0.3      NRBC% 0.0     MAGNESIUM - Normal    Magnesium Level 1.80     CULTURE, URINE   CBC W/ AUTO DIFFERENTIAL    Narrative:     The following orders were created for panel order CBC auto differential.  Procedure                               Abnormality         Status                     ---------                               -----------         ------                     CBC with Differential[[id number]]       Abnormal            Final result                 Please view results for these tests on the individual orders.   COMPREHENSIVE METABOLIC PANEL    Sodium 141      Potassium 4.0      Chloride 107      CO2 26      Glucose 93      Blood Urea Nitrogen 11.6      Creatinine 0.78      Calcium 9.3      Protein Total 7.2      Albumin 3.7      Globulin 3.5      Albumin/Globulin Ratio 1.1      Bilirubin Total 0.5      ALP 75      ALT 47      AST 26      eGFR >60      Anion Gap 8.0      BUN/Creatinine Ratio 15     EXTRA TUBES    Narrative:     The following orders were created for panel order EXTRA TUBES.  Procedure                               Abnormality         Status                     ---------                               -----------         ------                     Light Blue Top Hold[[id number]]                             Final result               Light Green Top Hold[[id number]]                            Final result               Lavender Top Hold[[id number]]                               Final result               Gold Top Hold[[id number]]                                   Final result                 Please view results for these tests on the individual orders.   LIGHT BLUE TOP HOLD    Extra Tube Hold for add-ons.     LIGHT GREEN TOP HOLD    Extra Tube Hold for add-ons.     LAVENDER TOP HOLD    Extra Tube Hold for add-ons.     GOLD TOP HOLD    Extra Tube Hold for add-ons.     POCT URINE PREGNANCY     POC Preg Test, Ur Negative       Acceptable Yes          ECG Results              EKG 12-lead (In process)        Collection Time Result Time QRS Duration [facility] QTC Calculation    09/03/24 15:39:03 09/03/24 15:42:27 88 446                     In process by Interface, Lab In [facility] (09/03/24 15:42:32)                   Narrative:    Test Reason : R56.9,    Vent. Rate : 082 BPM     Atrial Rate : 082 BPM     P-R Int : 166 ms          QRS Dur : 088 ms      QT Int : 382 ms       P-R-T Axes : 045 041 018 degrees     QTc Int : 446 ms    Normal sinus rhythm with sinus arrhythmia  Normal ECG  When compared with ECG of 06-AUG-2023 18:32,  No significant change was found    Referred By:             Confirmed By:                                   Imaging Results              X-Ray Chest AP Portable (Final result)  Result time 09/03/24 16:52:41      Final result by Bao Langston MD (09/03/24 16:52:41)                   Impression:      No acute findings.      Electronically signed by: Bao Langston  Date:    09/03/2024  Time:    16:52               Narrative:    EXAMINATION:  XR CHEST AP PORTABLE    CLINICAL HISTORY:  Seizure;    COMPARISON:  18 April 2024    FINDINGS:  Frontal view of the chest was obtained. The heart is not enlarged.  Lungs are clear.  There is no pneumothorax or significant effusion.                                       CT Head Without Contrast (Final result)  Result time 09/03/24 16:34:30      Final result by Kyle Ballard MD (09/03/24 16:34:30)                   Impression:      Chronic maxillary sinusitis bilaterally    Otherwise unremarkable      Electronically signed by: Anupam Ballard  Date:    09/03/2024  Time:    16:34               Narrative:    EXAMINATION:  CT HEAD WITHOUT CONTRAST    CLINICAL HISTORY:  Seizure, new-onset, no history of trauma;    TECHNIQUE:  Multiple axial images were obtained from the base of the brain to the vertex without contrast administration.   Sagittal and coronal reconstructions were performed. .Automatic exposure control  (AEC) is utilized to reduce patient radiation exposure.    COMPARISON:  02/21/2024    FINDINGS:  There is no intracranial mass or lesion seen.  No hemorrhage is seen.  No infarct is seen.  The ventricles and basilar cisterns appear normal.  Brain parenchyma appears grossly unremarkable.    Posterior fossa appears normal.  The calvarium is intact.  There is evidence of chronic maxillary sinusitis bilaterally.                                       Medications   sodium chloride 0.9% bolus 1,000 mL 1,000 mL (0 mLs Intravenous Stopped 9/3/24 1640)     Medical Decision Making  Vital signs stable.  Patient was currently at her neuro baseline.  She was received IV fluids.  EKGs without concerning changes.  CBC, CMP were reassuring.  Urine with small amount of white cells present but the patient is without symptoms of the UTI. CT of the brain negative for acute process.  Overall consistent with the patient was psychogenic nonepileptic seizures.  She states she has been having daily headaches since her delivery, blood pressure stable and no evidence of preeclampsia on my evaluation.  Will start on daily magnesium supplements.  Return precautions were given    Amount and/or Complexity of Data Reviewed  Labs: ordered.  Radiology: ordered.    Risk  OTC drugs.      Additional MDM:   Differential Diagnosis:   Febrile seizure, epilepsy, brain mass, intoxication, medication side effect, stroke, dysrhythmia, among others                                     Clinical Impression:  Final diagnoses:  [F44.5] Psychogenic nonepileptic seizure (Primary)          ED Disposition Condition    Discharge Stable          ED Prescriptions       Medication Sig Dispense Start Date End Date Auth. Provider    magnesium oxide (MAG-OX) 400 mg (241.3 mg magnesium) tablet Take 1 tablet (400 mg total) by mouth once daily. 30 tablet 9/3/2024 -- Tony Anand MD           Follow-up Information       Follow up With Specialties Details Why Contact Info    [facility] Emergency Medicine Go to  If symptoms worsen [address] [facility] [address] [phone]    Danielle Watts MD Family Medicine Call  As needed [address] [facility] [address]  [phone]               Tony Anand MD  09/03/24 4705

## 2024-09-05 LAB — BACTERIA UR CULT: NORMAL

## 2025-01-14 ENCOUNTER — OFFICE VISIT (OUTPATIENT)
Dept: FAMILY MEDICINE | Facility: CLINIC | Age: 30
End: 2025-01-14
Payer: MEDICAID

## 2025-01-14 VITALS
SYSTOLIC BLOOD PRESSURE: 114 MMHG | BODY MASS INDEX: 40.18 KG/M2 | RESPIRATION RATE: 18 BRPM | HEART RATE: 81 BPM | HEIGHT: 67 IN | OXYGEN SATURATION: 98 % | TEMPERATURE: 99 F | WEIGHT: 256 LBS | DIASTOLIC BLOOD PRESSURE: 79 MMHG

## 2025-01-14 DIAGNOSIS — Z00.00 ENCOUNTER FOR WELLNESS EXAMINATION: Primary | ICD-10-CM

## 2025-01-14 DIAGNOSIS — F41.9 ANXIETY AND DEPRESSION: ICD-10-CM

## 2025-01-14 DIAGNOSIS — F32.A ANXIETY AND DEPRESSION: ICD-10-CM

## 2025-01-14 DIAGNOSIS — G25.81 RESTLESS LEG SYNDROME: ICD-10-CM

## 2025-01-14 DIAGNOSIS — G44.52 NEW PERSISTENT DAILY HEADACHE: ICD-10-CM

## 2025-01-14 PROBLEM — Z34.90 TERM PREGNANCY, REPEAT: Status: RESOLVED | Noted: 2024-07-09 | Resolved: 2025-01-14

## 2025-01-14 LAB
25(OH)D3+25(OH)D2 SERPL-MCNC: 16 NG/ML (ref 30–80)
ALBUMIN SERPL-MCNC: 4 G/DL (ref 3.5–5)
ALBUMIN/GLOB SERPL: 1.1 RATIO (ref 1.1–2)
ALP SERPL-CCNC: 61 UNIT/L (ref 40–150)
ALT SERPL-CCNC: 32 UNIT/L (ref 0–55)
ANION GAP SERPL CALC-SCNC: 6 MEQ/L
AST SERPL-CCNC: 21 UNIT/L (ref 5–34)
BACTERIA #/AREA URNS AUTO: ABNORMAL /HPF
BASOPHILS # BLD AUTO: 0.04 X10(3)/MCL
BASOPHILS NFR BLD AUTO: 0.6 %
BILIRUB SERPL-MCNC: 0.8 MG/DL
BILIRUB UR QL STRIP.AUTO: NEGATIVE
BUN SERPL-MCNC: 12.5 MG/DL (ref 7–18.7)
CALCIUM SERPL-MCNC: 9.4 MG/DL (ref 8.4–10.2)
CHLORIDE SERPL-SCNC: 105 MMOL/L (ref 98–107)
CHOLEST SERPL-MCNC: 181 MG/DL
CHOLEST/HDLC SERPL: 5 {RATIO} (ref 0–5)
CLARITY UR: CLEAR
CO2 SERPL-SCNC: 28 MMOL/L (ref 22–29)
COLOR UR AUTO: YELLOW
CREAT SERPL-MCNC: 0.62 MG/DL (ref 0.55–1.02)
CREAT/UREA NIT SERPL: 20
EOSINOPHIL # BLD AUTO: 0.1 X10(3)/MCL (ref 0–0.9)
EOSINOPHIL NFR BLD AUTO: 1.5 %
ERYTHROCYTE [DISTWIDTH] IN BLOOD BY AUTOMATED COUNT: 13.8 % (ref 11.5–17)
EST. AVERAGE GLUCOSE BLD GHB EST-MCNC: 96.8 MG/DL
GFR SERPLBLD CREATININE-BSD FMLA CKD-EPI: >60 ML/MIN/1.73/M2
GLOBULIN SER-MCNC: 3.8 GM/DL (ref 2.4–3.5)
GLUCOSE SERPL-MCNC: 92 MG/DL (ref 74–100)
GLUCOSE UR QL STRIP: NORMAL
HAV IGM SERPL QL IA: NONREACTIVE
HBA1C MFR BLD: 5 %
HBV CORE IGM SERPL QL IA: NONREACTIVE
HBV SURFACE AG SERPL QL IA: NONREACTIVE
HCT VFR BLD AUTO: 39.5 % (ref 37–47)
HCV AB SERPL QL IA: NONREACTIVE
HDLC SERPL-MCNC: 40 MG/DL (ref 35–60)
HGB BLD-MCNC: 12.7 G/DL (ref 12–16)
HGB UR QL STRIP: ABNORMAL
HIV 1+2 AB+HIV1 P24 AG SERPL QL IA: NONREACTIVE
HYALINE CASTS #/AREA URNS LPF: ABNORMAL /LPF
IMM GRANULOCYTES # BLD AUTO: 0.01 X10(3)/MCL (ref 0–0.04)
IMM GRANULOCYTES NFR BLD AUTO: 0.2 %
KETONES UR QL STRIP: NEGATIVE
LDLC SERPL CALC-MCNC: 80 MG/DL (ref 50–140)
LEUKOCYTE ESTERASE UR QL STRIP: 25
LYMPHOCYTES # BLD AUTO: 1.91 X10(3)/MCL (ref 0.6–4.6)
LYMPHOCYTES NFR BLD AUTO: 29.4 %
MCH RBC QN AUTO: 26.2 PG (ref 27–31)
MCHC RBC AUTO-ENTMCNC: 32.2 G/DL (ref 33–36)
MCV RBC AUTO: 81.4 FL (ref 80–94)
MONOCYTES # BLD AUTO: 0.64 X10(3)/MCL (ref 0.1–1.3)
MONOCYTES NFR BLD AUTO: 9.8 %
MUCOUS THREADS URNS QL MICRO: ABNORMAL /LPF
NEUTROPHILS # BLD AUTO: 3.8 X10(3)/MCL (ref 2.1–9.2)
NEUTROPHILS NFR BLD AUTO: 58.5 %
NITRITE UR QL STRIP: NEGATIVE
NRBC BLD AUTO-RTO: 0 %
PH UR STRIP: 6 [PH]
PLATELET # BLD AUTO: 395 X10(3)/MCL (ref 130–400)
PMV BLD AUTO: 9 FL (ref 7.4–10.4)
POTASSIUM SERPL-SCNC: 4.2 MMOL/L (ref 3.5–5.1)
PROT SERPL-MCNC: 7.8 GM/DL (ref 6.4–8.3)
PROT UR QL STRIP: NEGATIVE
RBC # BLD AUTO: 4.85 X10(6)/MCL (ref 4.2–5.4)
RBC #/AREA URNS AUTO: ABNORMAL /HPF
SODIUM SERPL-SCNC: 139 MMOL/L (ref 136–145)
SP GR UR STRIP.AUTO: 1.03 (ref 1–1.03)
SQUAMOUS #/AREA URNS LPF: ABNORMAL /HPF
T PALLIDUM AB SER QL: NONREACTIVE
T4 FREE SERPL-MCNC: 0.87 NG/DL (ref 0.7–1.48)
TRIGL SERPL-MCNC: 304 MG/DL (ref 37–140)
TSH SERPL-ACNC: 0.43 UIU/ML (ref 0.35–4.94)
UROBILINOGEN UR STRIP-ACNC: NORMAL
VIT B12 SERPL-MCNC: 343 PG/ML (ref 213–816)
VLDLC SERPL CALC-MCNC: 61 MG/DL
WBC # BLD AUTO: 6.5 X10(3)/MCL (ref 4.5–11.5)
WBC #/AREA URNS AUTO: ABNORMAL /HPF

## 2025-01-14 PROCEDURE — 99385 PREV VISIT NEW AGE 18-39: CPT | Mod: S$PBB,,, | Performed by: NURSE PRACTITIONER

## 2025-01-14 PROCEDURE — 3008F BODY MASS INDEX DOCD: CPT | Mod: CPTII,,, | Performed by: NURSE PRACTITIONER

## 2025-01-14 PROCEDURE — 80053 COMPREHEN METABOLIC PANEL: CPT | Performed by: NURSE PRACTITIONER

## 2025-01-14 PROCEDURE — 85025 COMPLETE CBC W/AUTO DIFF WBC: CPT | Performed by: NURSE PRACTITIONER

## 2025-01-14 PROCEDURE — 84443 ASSAY THYROID STIM HORMONE: CPT | Performed by: NURSE PRACTITIONER

## 2025-01-14 PROCEDURE — 81001 URINALYSIS AUTO W/SCOPE: CPT | Performed by: NURSE PRACTITIONER

## 2025-01-14 PROCEDURE — 83036 HEMOGLOBIN GLYCOSYLATED A1C: CPT | Performed by: NURSE PRACTITIONER

## 2025-01-14 PROCEDURE — 80061 LIPID PANEL: CPT | Performed by: NURSE PRACTITIONER

## 2025-01-14 PROCEDURE — 36415 COLL VENOUS BLD VENIPUNCTURE: CPT | Performed by: NURSE PRACTITIONER

## 2025-01-14 PROCEDURE — 99213 OFFICE O/P EST LOW 20 MIN: CPT | Mod: PBBFAC,PN | Performed by: NURSE PRACTITIONER

## 2025-01-14 PROCEDURE — 84439 ASSAY OF FREE THYROXINE: CPT | Performed by: NURSE PRACTITIONER

## 2025-01-14 PROCEDURE — 3078F DIAST BP <80 MM HG: CPT | Mod: CPTII,,, | Performed by: NURSE PRACTITIONER

## 2025-01-14 PROCEDURE — 1159F MED LIST DOCD IN RCRD: CPT | Mod: CPTII,,, | Performed by: NURSE PRACTITIONER

## 2025-01-14 PROCEDURE — 86780 TREPONEMA PALLIDUM: CPT | Performed by: NURSE PRACTITIONER

## 2025-01-14 PROCEDURE — 82306 VITAMIN D 25 HYDROXY: CPT | Performed by: NURSE PRACTITIONER

## 2025-01-14 PROCEDURE — 99214 OFFICE O/P EST MOD 30 MIN: CPT | Mod: 25,S$PBB,, | Performed by: NURSE PRACTITIONER

## 2025-01-14 PROCEDURE — 3074F SYST BP LT 130 MM HG: CPT | Mod: CPTII,,, | Performed by: NURSE PRACTITIONER

## 2025-01-14 PROCEDURE — 80074 ACUTE HEPATITIS PANEL: CPT | Performed by: NURSE PRACTITIONER

## 2025-01-14 PROCEDURE — 82607 VITAMIN B-12: CPT | Performed by: NURSE PRACTITIONER

## 2025-01-14 PROCEDURE — 87389 HIV-1 AG W/HIV-1&-2 AB AG IA: CPT | Performed by: NURSE PRACTITIONER

## 2025-01-14 RX ORDER — CITALOPRAM 10 MG/1
10 TABLET ORAL DAILY
Qty: 30 TABLET | Refills: 11 | Status: SHIPPED | OUTPATIENT
Start: 2025-01-14 | End: 2026-01-14

## 2025-01-14 RX ORDER — GABAPENTIN 300 MG/1
300 CAPSULE ORAL DAILY PRN
COMMUNITY
End: 2025-01-14 | Stop reason: SDUPTHER

## 2025-01-14 RX ORDER — GABAPENTIN 300 MG/1
300 CAPSULE ORAL DAILY
Qty: 30 CAPSULE | Refills: 6 | Status: SHIPPED | OUTPATIENT
Start: 2025-01-14

## 2025-01-14 RX ORDER — SUMATRIPTAN SUCCINATE 50 MG/1
TABLET ORAL
Qty: 20 TABLET | Refills: 5 | Status: SHIPPED | OUTPATIENT
Start: 2025-01-14

## 2025-01-14 NOTE — ASSESSMENT & PLAN NOTE
6 month daily HA despite adequate hydration and treatment of allergies/sinuses.  Has had headaches daily that occur in back of head and top of head, light sensitive. No n/v.  Has not been to ER with HA.  Has been dx with migraines as a child and was on medication, unsure of names.   Trial imitrex 50 mg at onset and repeat in 2 hours if no improvement. Rx sent today  Also, refer to neurology. MRI brain ordered today.

## 2025-01-14 NOTE — PROGRESS NOTES
Patient Name: Latonya Juares     : 1995    MRN: [id number]     Subjective:     Patient ID: Latonya Juares is a 29 y.o. female.    Chief Complaint:   Chief Complaint   Patient presents with    Establish Care     Pt is here to establish care with PCP. Pt requesting  referral. Pt reports h/o anxiety, depression, restless leg syndrome, and daily HA since  2024        HPI: 25:  Establish care with PCP.      Hx anxiety/depression.  Delivered son 6 months ago.  Has had anxiety/depression prior to pregnancy.  She was put on Celexa but had not taken long enough and had to stop when she got pregnant.  Is willing to try it again.  Denies SI/HI.      Hx RLS, pain in legs.  Was on Gabapentin 300mg daily and would like refilled.      Also has been having daily HA worse in intensity and frequency. She has a HA everyday from back of neck to top of head with light sensitivity.  Denies n/v. States was dx with migraines as a child.  Is not on preventive meds. Is willing to trial something to abort.  Amenable to a referral to neurology also for better preventive options.          ROS:      Review of Systems   Constitutional: Negative.    HENT: Negative.     Eyes: Negative.    Respiratory: Negative.     Cardiovascular: Negative.    Gastrointestinal: Negative.    Genitourinary: Negative.    Musculoskeletal: Negative.    Skin: Negative.    Neurological: Negative.    Endo/Heme/Allergies: Negative.    Psychiatric/Behavioral: Negative.     All other systems reviewed and are negative.           History:     Past Medical History:   Diagnosis Date    Depression     Restless leg     Term pregnancy, repeat 2024        Past Surgical History:   Procedure Laterality Date     SECTION       SECTION N/A 2024    Procedure:  SECTION;  Surgeon: Ian Tate Jr., MD;  Location: [facility] L&[facility];  Service: OB/GYN;  Laterality: N/A;    CHOLECYSTECTOMY      PLACEMENT-STENT Left     left  "leg       No family history on file.     Social History     Tobacco Use    Smoking status: Never    Smokeless tobacco: Never   Substance and Sexual Activity    Alcohol use: Not Currently    Drug use: Never    Sexual activity: Not Currently       Current Outpatient Medications   Medication Instructions    citalopram (CELEXA) 10 mg, Oral, Daily    gabapentin (NEURONTIN) 300 mg, Oral, Daily    magnesium oxide (MAG-OX) 400 mg, Oral, Daily    sumatriptan (IMITREX) 50 MG tablet Take 1 tablet by mouth at onset of HA. May repeat in 2 hours if no improvement in HA.  Do not exceed 200 mg in 24 hours.        Review of patient's allergies indicates:   Allergen Reactions    Carrot Swelling       Objective:     Visit Vitals  /79 (BP Location: Left arm, Patient Position: Sitting)   Pulse 81   Temp 98.6 °F (37 °C) (Oral)   Resp 18   Ht 5' 7" (1.702 m)   Wt 116.1 kg (256 lb)   LMP 01/04/2025 (Approximate)   SpO2 98%   Breastfeeding No   BMI 40.10 kg/m²       Physical Examination:     Physical Exam  Vitals and nursing note reviewed.   Constitutional:       General: She is awake.      Appearance: Normal appearance. She is well-developed, well-groomed and normal weight.   HENT:      Head: Normocephalic and atraumatic.      Right Ear: Hearing, tympanic membrane, ear canal and external ear normal.      Left Ear: Hearing, tympanic membrane, ear canal and external ear normal.      Nose: Nose normal.      Mouth/Throat:      Lips: Pink.      Mouth: Mucous membranes are moist.      Tongue: No lesions.      Pharynx: Oropharynx is clear. No posterior oropharyngeal erythema.   Eyes:      General: Lids are normal. Vision grossly intact.      Extraocular Movements: Extraocular movements intact.      Conjunctiva/sclera: Conjunctivae normal.      Pupils: Pupils are equal, round, and reactive to light.   Neck:      Thyroid: No thyroid mass.      Vascular: No carotid bruit.      Trachea: Trachea and phonation normal.   Cardiovascular:      Rate " and Rhythm: Normal rate and regular rhythm.      Pulses: Normal pulses.      Heart sounds: Normal heart sounds, S1 normal and S2 normal.   Pulmonary:      Effort: Pulmonary effort is normal.      Breath sounds: Normal breath sounds. No decreased breath sounds, wheezing, rhonchi or rales.   Abdominal:      General: Abdomen is flat. Bowel sounds are normal.      Palpations: Abdomen is soft.      Tenderness: There is no abdominal tenderness.   Musculoskeletal:         General: Normal range of motion.      Cervical back: Full passive range of motion without pain and normal range of motion.      Right lower leg: No edema.      Left lower leg: No edema.   Lymphadenopathy:      Cervical: No cervical adenopathy.   Skin:     General: Skin is warm and dry.      Capillary Refill: Capillary refill takes less than 2 seconds.   Neurological:      General: No focal deficit present.      Mental Status: She is alert and oriented to person, place, and time.      GCS: GCS eye subscore is 4. GCS verbal subscore is 5. GCS motor subscore is 6.      Cranial Nerves: Cranial nerves 2-12 are intact.      Sensory: Sensation is intact.      Motor: Motor function is intact.      Coordination: Coordination is intact.      Gait: Gait is intact.   Psychiatric:         Attention and Perception: Attention and perception normal.         Mood and Affect: Mood normal.         Speech: Speech normal.         Behavior: Behavior normal. Behavior is cooperative.         Thought Content: Thought content normal.         Cognition and Memory: Cognition and memory normal.         Lab Results:     Chemistry:  Lab Results   Component Value Date     09/03/2024    K 4.0 09/03/2024    BUN 11.6 09/03/2024    CREATININE 0.78 09/03/2024    EGFRNORACEVR >60 09/03/2024    GLUCOSE 93 09/03/2024    CALCIUM 9.3 09/03/2024    ALKPHOS 75 09/03/2024    LABPROT 7.2 09/03/2024    ALBUMIN 3.7 09/03/2024    BILIDIR 0.2 02/22/2022    IBILI 0.40 02/22/2022    AST 26 09/03/2024  "   ALT 47 09/03/2024    MG 1.80 09/03/2024    TSH 0.543 02/09/2024        No results found for: "HGBA1C", "MICROALBCREA"     Hematology:  Lab Results   Component Value Date    WBC 11.52 (H) 09/03/2024    HGB 11.3 (L) 09/03/2024    HCT 35.4 (L) 09/03/2024     09/03/2024       Lipid Panel:  No results found for: "CHOL", "HDL", "LDL", "TRIG", "TOTALCHOLEST"     Urine:  Lab Results   Component Value Date    APPEARANCEUA Clear 09/03/2024    SGUA 1.030 09/03/2024    PROTEINUA Trace (A) 09/03/2024    KETONESUA Negative 09/03/2024    LEUKOCYTESUR 500 (A) 09/03/2024    RBCUA 6-10 (A) 09/03/2024    WBCUA 21-50 (A) 09/03/2024    BACTERIA None Seen 09/03/2024    SQEPUA Occ (A) 09/03/2024    HYALINECASTS None Seen 09/03/2024        Assessment:          ICD-10-CM ICD-9-CM   1. Encounter for wellness examination  Z00.00 V70.0   2. Restless leg syndrome  G25.81 333.94   3. New persistent daily headache  G44.52 339.42   4. Anxiety and depression  F41.9 300.00    F32.A 311        Plan:     1. Encounter for wellness examination  -     CBC Auto Differential  -     Comprehensive Metabolic Panel  -     Urinalysis  -     Hemoglobin A1C  -     TSH  -     T4, Free  -     Vitamin D  -     Vitamin B12  -     Hepatitis Panel, Acute  -     HIV 1/2 Ag/Ab (4th Gen)  -     SYPHILIS ANTIBODY (WITH REFLEX RPR)  -     Lipid Panel    2. Restless leg syndrome  -     gabapentin (NEURONTIN) 300 MG capsule; Take 1 capsule (300 mg total) by mouth once daily.  Dispense: 30 capsule; Refill: 6    3. New persistent daily headache  Assessment & Plan:  6 month daily HA despite adequate hydration and treatment of allergies/sinuses.  Has had headaches daily that occur in back of head and top of head, light sensitive. No n/v.  Has not been to ER with HA.  Has been dx with migraines as a child and was on medication, unsure of names.   Trial imitrex 50 mg at onset and repeat in 2 hours if no improvement. Rx sent today  Also, refer to neurology. MRI brain " ordered today.     Orders:  -     MRI Brain Without Contrast; Future; Expected date: 01/14/2025  -     sumatriptan (IMITREX) 50 MG tablet; Take 1 tablet by mouth at onset of HA. May repeat in 2 hours if no improvement in HA.  Do not exceed 200 mg in 24 hours.  Dispense: 20 tablet; Refill: 5    4. Anxiety and depression  Assessment & Plan:  Rx Celexa by previous MD.  Amenable to trying again.    Orders:  -     citalopram (CELEXA) 10 MG tablet; Take 1 tablet (10 mg total) by mouth once daily.  Dispense: 30 tablet; Refill: 11         Follow up in about 1 month (around 2/14/2025) for Review of labs..  In addition to their scheduled follow up, the patient has also been instructed to follow up on as needed basis.         Future Appointments   Date Time Provider Department Center   2/17/2025  1:00 PM Cris Leigh FNP [facility]        JAYSON Neves

## 2025-02-28 ENCOUNTER — OFFICE VISIT (OUTPATIENT)
Dept: FAMILY MEDICINE | Facility: CLINIC | Age: 30
End: 2025-02-28
Payer: MEDICAID

## 2025-02-28 VITALS
WEIGHT: 252 LBS | HEART RATE: 79 BPM | TEMPERATURE: 98 F | OXYGEN SATURATION: 98 % | RESPIRATION RATE: 18 BRPM | HEIGHT: 67 IN | DIASTOLIC BLOOD PRESSURE: 73 MMHG | BODY MASS INDEX: 39.55 KG/M2 | SYSTOLIC BLOOD PRESSURE: 112 MMHG

## 2025-02-28 DIAGNOSIS — G25.81 RESTLESS LEG SYNDROME: ICD-10-CM

## 2025-02-28 DIAGNOSIS — G56.03 BILATERAL CARPAL TUNNEL SYNDROME: ICD-10-CM

## 2025-02-28 DIAGNOSIS — M25.50 CHRONIC PAIN OF MULTIPLE JOINTS: Primary | ICD-10-CM

## 2025-02-28 DIAGNOSIS — G89.29 CHRONIC PAIN OF MULTIPLE JOINTS: Primary | ICD-10-CM

## 2025-02-28 DIAGNOSIS — G44.52 NEW PERSISTENT DAILY HEADACHE: ICD-10-CM

## 2025-02-28 DIAGNOSIS — F41.9 ANXIETY AND DEPRESSION: ICD-10-CM

## 2025-02-28 DIAGNOSIS — F32.A ANXIETY AND DEPRESSION: ICD-10-CM

## 2025-02-28 LAB
CRP SERPL-MCNC: 3.2 MG/L
ERYTHROCYTE [SEDIMENTATION RATE] IN BLOOD: 13 MM/HR (ref 0–15)

## 2025-02-28 PROCEDURE — 83516 IMMUNOASSAY NONANTIBODY: CPT | Performed by: NURSE PRACTITIONER

## 2025-02-28 PROCEDURE — 84445 ASSAY OF TSI GLOBULIN: CPT | Performed by: NURSE PRACTITIONER

## 2025-02-28 PROCEDURE — 86162 COMPLEMENT TOTAL (CH50): CPT | Performed by: NURSE PRACTITIONER

## 2025-02-28 PROCEDURE — 36415 COLL VENOUS BLD VENIPUNCTURE: CPT | Performed by: NURSE PRACTITIONER

## 2025-02-28 PROCEDURE — 86376 MICROSOMAL ANTIBODY EACH: CPT | Performed by: NURSE PRACTITIONER

## 2025-02-28 PROCEDURE — 85652 RBC SED RATE AUTOMATED: CPT | Performed by: NURSE PRACTITIONER

## 2025-02-28 PROCEDURE — 86235 NUCLEAR ANTIGEN ANTIBODY: CPT | Performed by: NURSE PRACTITIONER

## 2025-02-28 PROCEDURE — 99214 OFFICE O/P EST MOD 30 MIN: CPT | Mod: PBBFAC,PN | Performed by: NURSE PRACTITIONER

## 2025-02-28 PROCEDURE — 86140 C-REACTIVE PROTEIN: CPT | Performed by: NURSE PRACTITIONER

## 2025-02-28 PROCEDURE — 86039 ANTINUCLEAR ANTIBODIES (ANA): CPT | Performed by: NURSE PRACTITIONER

## 2025-02-28 RX ORDER — CITALOPRAM 20 MG/1
20 TABLET, FILM COATED ORAL DAILY
Qty: 30 TABLET | Refills: 11 | Status: SHIPPED | OUTPATIENT
Start: 2025-02-28

## 2025-02-28 RX ORDER — IBUPROFEN 800 MG/1
800 TABLET ORAL 3 TIMES DAILY
Qty: 90 TABLET | Refills: 3 | Status: SHIPPED | OUTPATIENT
Start: 2025-02-28

## 2025-02-28 NOTE — PATIENT INSTRUCTIONS
Beltran Hanks,     If you are due for any health screening(s) below please notify me so we can arrange them to be ordered and scheduled. Most healthy patients at your age complete them, but you are free to accept or refuse.     If you can't do it, I'll definitely understand. If you can, I'd certainly appreciate it!    Tests to Keep You Healthy    Cervical Cancer Screening: DUE      Your cervical cancer screening is due     Our records indicate that you may be overdue for your screening Pap smear. A Pap smear is an important health screening that can detect abnormal cells that can become cervical cancer. Cervical cancer screenings allow for early diagnosis and increase the likelihood of successful treatment.     The current recommendation for Pap smear screening is every 3-5 years for women at average risk. We encourage you to schedule your appointment with your womens health provider. Many women see a gynecologist for this screening, but some primary care providers also provide Pap screening.     If you recently had your Pap smear screening performed outside of [facility], please let your health care team know so that they can update your health record.

## 2025-02-28 NOTE — ASSESSMENT & PLAN NOTE
HA have lessened with Ibuprofen and Imitrex. Has not been able to schedule MRI yet.  She will reschedule.   Ibuprofen 800mg po TID

## 2025-02-28 NOTE — PROGRESS NOTES
"Patient Name: Latonya Juares     : 1995    MRN: [id number]     Subjective:     Patient ID: Latonya Juares is a 29 y.o. female.    Chief Complaint:   Chief Complaint   Patient presents with    Follow-up     Pt is here for follow up. Pt reports decreased HA's and will r/s MRI. Pt also c/o joint pain        HPI: HPI      ROS:      ROS        History:     Past Medical History:   Diagnosis Date    Depression     Restless leg     Term pregnancy, repeat 2024        Past Surgical History:   Procedure Laterality Date     SECTION       SECTION N/A 2024    Procedure:  SECTION;  Surgeon: aIn Tate Jr., MD;  [facility]  Service: OB/GYN;  Laterality: N/A;    CHOLECYSTECTOMY      PLACEMENT-STENT Left     left leg       No family history on file.     Social History     Tobacco Use    Smoking status: Never    Smokeless tobacco: Never   Substance and Sexual Activity    Alcohol use: Not Currently    Drug use: Never    Sexual activity: Not Currently       Current Outpatient Medications   Medication Instructions    citalopram (CELEXA) 10 mg, Oral, Daily    gabapentin (NEURONTIN) 300 mg, Oral, Daily    magnesium oxide (MAG-OX) 400 mg, Oral, Daily    sumatriptan (IMITREX) 50 MG tablet Take 1 tablet by mouth at onset of HA. May repeat in 2 hours if no improvement in HA.  Do not exceed 200 mg in 24 hours.        Review of patient's allergies indicates:   Allergen Reactions    Carrot Swelling       Objective:     Visit Vitals  /73 (BP Location: Left arm, Patient Position: Sitting)   Pulse 79   Temp 97.9 °F (36.6 °C) (Oral)   Resp 18   Ht 5' 7" (1.702 m)   Wt 116.1 kg (256 lb)   LMP 2025 (Approximate)   SpO2 98%   BMI 40.10 kg/m²       Physical Examination:     Physical Exam    Lab Results:     Chemistry:  Lab Results   Component Value Date     2025    K 4.2 2025    BUN 12.5 2025    CREATININE 0.62 2025    " EGFRNORACEVR >60 01/14/2025    GLUCOSE 92 01/14/2025    CALCIUM 9.4 01/14/2025    ALKPHOS 61 01/14/2025    LABPROT 7.8 01/14/2025    ALBUMIN 4.0 01/14/2025    BILIDIR 0.2 02/22/2022    IBILI 0.40 02/22/2022    AST 21 01/14/2025    ALT 32 01/14/2025    MG 1.80 09/03/2024    [id number] 16 (L) 01/14/2025    TSH 0.429 01/14/2025    LEKQUS6YYUH 0.87 01/14/2025        Lab Results   Component Value Date    HGBA1C 5.0 01/14/2025        Hematology:  Lab Results   Component Value Date    WBC 6.50 01/14/2025    HGB 12.7 01/14/2025    HCT 39.5 01/14/2025     01/14/2025       Lipid Panel:  Lab Results   Component Value Date    CHOL 181 01/14/2025    HDL 40 01/14/2025    LDL 80.00 01/14/2025    TRIG 304 (H) 01/14/2025    TOTALCHOLEST 5 01/14/2025        Urine:  Lab Results   Component Value Date    APPEARANCEUA Clear 01/14/2025    SGUA 1.029 01/14/2025    PROTEINUA Negative 01/14/2025    KETONESUA Negative 01/14/2025    LEUKOCYTESUR 25 (A) 01/14/2025    RBCUA 0-5 01/14/2025    WBCUA 0-5 01/14/2025    BACTERIA None Seen 01/14/2025    SQEPUA Occasional (A) 01/14/2025    HYALINECASTS None Seen 01/14/2025        Assessment:        No diagnosis found.       Plan:     {There are no diagnoses linked to this encounter. (Refresh or delete this SmartLink)}       No follow-ups on file..  In addition to their scheduled follow up, the patient has also been instructed to follow up on as needed basis.       No future appointments.     JAYSON Neves

## 2025-02-28 NOTE — ASSESSMENT & PLAN NOTE
Increase celexa to 20 mg po daily  Obtain counseling resources, list given for patient to find.    Practice deep breathing or abdominal breathing exercises when anxiety occurs.  Exercise daily. Get sunlight daily.  Avoid caffeine, alcohol and stimulants.  Practice positive phrases and repeat throughout the day, yoga, lavender scents or Chamomile tea will help anxiety.  Set healthy boundaries, avoid people and conversations that increase stress.  Reports any symptoms of suicidal or homicidal ideations immediately, if clinic is closed go to nearest emergency room.

## 2025-02-28 NOTE — PROGRESS NOTES
Patient Name: Latonya Juares     : 1995    MRN: [id number]     Subjective:     Patient ID: Latonya Juares is a 29 y.o. female.    Chief Complaint:   Chief Complaint   Patient presents with    Follow-up     Pt is here for follow up. Pt reports decreased HA's and will r/s MRI. Pt also c/o joint pain        HPI: 25: Review of labs, multiple joint pain c/o, daily HA, bilateral carpal tunnel syndrome, inability to lose weight new complaints today.     Multiple joint pain-  Family hx of Lupus and RA.  Multiple joint pain reported for a long time. Long standing hx of chronic back pain due to incidents that happened in childhood.  She lives with pain in back all the time since childhood.      Inability to lose weight-  States has tried numerous diets and failed. Has been trying all of her life to lose weight and can't.  She typically eats 2 small sausage biscuits for breakfast and a banana.  Lunch usually includes whatever is cooked for lunch at school. She typically skips supper.  She does not cook meals during the week. She will cook on weekends and includes burritos with tortillas, 80/20 ground meat. She will eat noodles for snacks or granola bars as time allows.    Has been told in the past that she has cysts on her ovaries but has not been worked up for PCOS.  A1c 5.0 and glucose WNL.  Hx hypoglycemia before 2nd pregnancy and then it resolved.  She drinks 2 40 oz bottles of water daily.  Has a small child so her sleep is limited and interrupted.      Bilateral carpal tunnel syndrome-  Does not use wrist splints. 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  She works in a  and is moving all day long.      Daily DURON's-  Pt has reported improvement in HA's since starting Imitrex.  Also taking Ibuprofen 400mg in between Imitrex.  Did not yet do her MRI.  Will reschedule.               ROS:      Review of Systems   Constitutional:  Positive for malaise/fatigue.   Musculoskeletal:  Positive for back  "pain, joint pain and myalgias.   Neurological:  Positive for headaches.   All other systems reviewed and are negative.        History:     Past Medical History:   Diagnosis Date    Depression     Restless leg     Term pregnancy, repeat 2024        Past Surgical History:   Procedure Laterality Date     SECTION       SECTION N/A 2024    Procedure:  SECTION;  Surgeon: Ian Tate Jr., MD;  [facility];  Service: OB/GYN;  Laterality: N/A;    CHOLECYSTECTOMY      PLACEMENT-STENT Left     left leg       No family history on file.     Social History     Tobacco Use    Smoking status: Never    Smokeless tobacco: Never   Substance and Sexual Activity    Alcohol use: Not Currently    Drug use: Never    Sexual activity: Not Currently       Current Outpatient Medications   Medication Instructions    citalopram (CELEXA) 20 mg, Oral, Daily    gabapentin (NEURONTIN) 300 mg, Oral, Daily    ibuprofen (ADVIL,MOTRIN) 800 mg, Oral, 3 times daily    magnesium oxide (MAG-OX) 400 mg, Oral, Daily    sumatriptan (IMITREX) 50 MG tablet Take 1 tablet by mouth at onset of HA. May repeat in 2 hours if no improvement in HA.  Do not exceed 200 mg in 24 hours.        Review of patient's allergies indicates:   Allergen Reactions    Carrot Swelling       Objective:     Visit Vitals  /73 (BP Location: Left arm, Patient Position: Sitting)   Pulse 79   Temp 97.9 °F (36.6 °C) (Oral)   Resp 18   Ht 5' 7" (1.702 m)   Wt 116.1 kg (256 lb)   LMP 2025 (Approximate)   SpO2 98%   BMI 40.10 kg/m²       Physical Examination:     Physical Exam  Vitals reviewed.   Constitutional:       Appearance: Normal appearance. She is normal weight.   HENT:      Head: Normocephalic.   Cardiovascular:      Rate and Rhythm: Normal rate and regular rhythm.      Pulses: Normal pulses.      Heart sounds: Normal heart sounds.   Pulmonary:      Effort: Pulmonary effort is normal.      Breath sounds: Normal breath " sounds.   Abdominal:      General: Abdomen is flat.      Palpations: Abdomen is soft.   Musculoskeletal:         General: Normal range of motion.      Cervical back: Normal range of motion.   Skin:     General: Skin is warm and dry.   Neurological:      Mental Status: She is alert.   Psychiatric:         Mood and Affect: Mood normal.         Lab Results:     Chemistry:  Lab Results   Component Value Date     01/14/2025    K 4.2 01/14/2025    BUN 12.5 01/14/2025    CREATININE 0.62 01/14/2025    EGFRNORACEVR >60 01/14/2025    GLUCOSE 92 01/14/2025    CALCIUM 9.4 01/14/2025    ALKPHOS 61 01/14/2025    LABPROT 7.8 01/14/2025    ALBUMIN 4.0 01/14/2025    BILIDIR 0.2 02/22/2022    IBILI 0.40 02/22/2022    AST 21 01/14/2025    ALT 32 01/14/2025    MG 1.80 09/03/2024    [id number] 16 (L) 01/14/2025    TSH 0.429 01/14/2025    SHAANO7ZQMM 0.87 01/14/2025        Lab Results   Component Value Date    HGBA1C 5.0 01/14/2025        Hematology:  Lab Results   Component Value Date    WBC 6.50 01/14/2025    HGB 12.7 01/14/2025    HCT 39.5 01/14/2025     01/14/2025       Lipid Panel:  Lab Results   Component Value Date    CHOL 181 01/14/2025    HDL 40 01/14/2025    LDL 80.00 01/14/2025    TRIG 304 (H) 01/14/2025    TOTALCHOLEST 5 01/14/2025        Urine:  Lab Results   Component Value Date    APPEARANCEUA Clear 01/14/2025    SGUA 1.029 01/14/2025    PROTEINUA Negative 01/14/2025    KETONESUA Negative 01/14/2025    LEUKOCYTESUR 25 (A) 01/14/2025    RBCUA 0-5 01/14/2025    WBCUA 0-5 01/14/2025    BACTERIA None Seen 01/14/2025    SQEPUA Occasional (A) 01/14/2025    HYALINECASTS None Seen 01/14/2025        Assessment:          ICD-10-CM ICD-9-CM   1. Chronic pain of multiple joints  M25.50 719.49    G89.29 338.29   2. Anxiety and depression  F41.9 300.00    F32.A 311   3. Bilateral carpal tunnel syndrome  G56.03 354.0   4. New persistent daily headache  G44.52 339.42   5. Restless leg syndrome  G25.81 333.94          Plan:      1. Chronic pain of multiple joints  Assessment & Plan:  Rheumatology workup ordered.     Orders:  -     RHEUMATOID ARTHRITIS PANEL  -     Complement, Total  -     GOPI IgG by IFA  -     Sedimentation rate  -     C-Reactive Protein  -     Thyroid Stimulating Immunoglobulin  -     Thyroid Peroxidase Antibody  -     Sjogrens syndrome-B extractable nuclear antibody  -     Anti-Smith Antibody  -     SCL-70 Antibodies  -     Anti-Renee 1 Antibody, IgG  -     Anti-Centromere Antibody  -     Cyclic Citrullinated Peptide Antibody, IgG  -     ibuprofen (ADVIL,MOTRIN) 800 MG tablet; Take 1 tablet (800 mg total) by mouth 3 (three) times daily.  Dispense: 90 tablet; Refill: 3    2. Anxiety and depression  Assessment & Plan:  Increase celexa to 20 mg po daily  Obtain counseling resources, list given for patient to find.    Practice deep breathing or abdominal breathing exercises when anxiety occurs.  Exercise daily. Get sunlight daily.  Avoid caffeine, alcohol and stimulants.  Practice positive phrases and repeat throughout the day, yoga, lavender scents or Chamomile tea will help anxiety.  Set healthy boundaries, avoid people and conversations that increase stress.  Reports any symptoms of suicidal or homicidal ideations immediately, if clinic is closed go to nearest emergency room.        Orders:  -     citalopram (CELEXA) 20 MG tablet; Take 1 tablet (20 mg total) by mouth once daily.  Dispense: 30 tablet; Refill: 11    3. Bilateral carpal tunnel syndrome  Assessment & Plan:  Bilateral wrist splints ordered for bilateral wrist pain    Orders:  -     WRIST BRACE FOR HOME USE  -     ibuprofen (ADVIL,MOTRIN) 800 MG tablet; Take 1 tablet (800 mg total) by mouth 3 (three) times daily.  Dispense: 90 tablet; Refill: 3    4. New persistent daily headache  Assessment & Plan:  HA have lessened with Ibuprofen and Imitrex. Has not been able to schedule MRI yet.  She will reschedule.   Ibuprofen 800mg po TID      Orders:  -     ibuprofen  (ADVIL,MOTRIN) 800 MG tablet; Take 1 tablet (800 mg total) by mouth 3 (three) times daily.  Dispense: 90 tablet; Refill: 3    5. Restless leg syndrome  Assessment & Plan:  Takes Gabapentin for RLS.              Follow up in about 2 weeks (around 3/14/2025) for Review of labs..  In addition to their scheduled follow up, the patient has also been instructed to follow up on as needed basis.       No future appointments.     JAYSON Neves

## 2025-03-09 DIAGNOSIS — G89.29 CHRONIC PAIN OF MULTIPLE JOINTS: Primary | ICD-10-CM

## 2025-03-09 DIAGNOSIS — R76.8 POSITIVE ANA (ANTINUCLEAR ANTIBODY): ICD-10-CM

## 2025-03-09 DIAGNOSIS — M25.50 CHRONIC PAIN OF MULTIPLE JOINTS: Primary | ICD-10-CM

## 2025-03-10 NOTE — PROGRESS NOTES
I have sent medications and/or lab orders in for this patient.  Please notify the patient.     Orders Placed This Encounter   Procedures    Ambulatory referral/consult to Rheumatology     Standing Status:   Future     Expected Date:   3/16/2025     Expiration Date:   4/9/2026     Referral Priority:   Routine     Referral Type:   Consultation     Referral Reason:   Specialty Services Required     Requested Specialty:   Rheumatology     Number of Visits Requested:   1

## 2025-03-19 ENCOUNTER — OFFICE VISIT (OUTPATIENT)
Dept: FAMILY MEDICINE | Facility: CLINIC | Age: 30
End: 2025-03-19
Payer: MEDICAID

## 2025-03-19 VITALS
RESPIRATION RATE: 18 BRPM | TEMPERATURE: 98 F | HEIGHT: 67 IN | HEART RATE: 73 BPM | DIASTOLIC BLOOD PRESSURE: 80 MMHG | BODY MASS INDEX: 39.08 KG/M2 | SYSTOLIC BLOOD PRESSURE: 115 MMHG | OXYGEN SATURATION: 98 % | WEIGHT: 249 LBS

## 2025-03-19 DIAGNOSIS — G89.29 CHRONIC PAIN OF MULTIPLE JOINTS: ICD-10-CM

## 2025-03-19 DIAGNOSIS — R76.8 POSITIVE ANA (ANTINUCLEAR ANTIBODY): Primary | ICD-10-CM

## 2025-03-19 DIAGNOSIS — M25.50 CHRONIC PAIN OF MULTIPLE JOINTS: ICD-10-CM

## 2025-03-19 PROCEDURE — 1159F MED LIST DOCD IN RCRD: CPT | Mod: CPTII,,, | Performed by: NURSE PRACTITIONER

## 2025-03-19 PROCEDURE — 3074F SYST BP LT 130 MM HG: CPT | Mod: CPTII,,, | Performed by: NURSE PRACTITIONER

## 2025-03-19 PROCEDURE — 3079F DIAST BP 80-89 MM HG: CPT | Mod: CPTII,,, | Performed by: NURSE PRACTITIONER

## 2025-03-19 PROCEDURE — 3044F HG A1C LEVEL LT 7.0%: CPT | Mod: CPTII,,, | Performed by: NURSE PRACTITIONER

## 2025-03-19 PROCEDURE — 3008F BODY MASS INDEX DOCD: CPT | Mod: CPTII,,, | Performed by: NURSE PRACTITIONER

## 2025-03-19 PROCEDURE — 1160F RVW MEDS BY RX/DR IN RCRD: CPT | Mod: CPTII,,, | Performed by: NURSE PRACTITIONER

## 2025-03-19 PROCEDURE — 99214 OFFICE O/P EST MOD 30 MIN: CPT | Mod: PBBFAC,PN | Performed by: NURSE PRACTITIONER

## 2025-03-19 PROCEDURE — 99213 OFFICE O/P EST LOW 20 MIN: CPT | Mod: S$PBB,,, | Performed by: NURSE PRACTITIONER

## 2025-03-19 NOTE — PROGRESS NOTES
Patient Name: Latonya Juares     : 1995    MRN: [id number]     Subjective:     Patient ID: Latonya Juares is a 29 y.o. female.    Chief Complaint:   Chief Complaint   Patient presents with    Follow-up     Pt is here for follow up.         HPI: 3/19/25: FU review of rheumatology labs.  Pt had +GOPI. I referred to Rheumatology prior to today's visit.  She is amenable to that plan.  She was concerned about having Lupus.  Testing for Lupus was negative.      Multiple joint pain-  Family hx of Lupus and RA.  Multiple joint pain reported for a long time. Long standing hx of chronic back pain due to incidents that happened in childhood.  She lives with pain in back all the time since childhood.          25: Review of labs, multiple joint pain c/o, daily HA, bilateral carpal tunnel syndrome, inability to lose weight new complaints today.     Multiple joint pain-  Family hx of Lupus and RA.  Multiple joint pain reported for a long time. Long standing hx of chronic back pain due to incidents that happened in childhood.  She lives with pain in back all the time since childhood.      Inability to lose weight-  States has tried numerous diets and failed. Has been trying all of her life to lose weight and can't.  She typically eats 2 small sausage biscuits for breakfast and a banana.  Lunch usually includes whatever is cooked for lunch at school. She typically skips supper.  She does not cook meals during the week. She will cook on weekends and includes burritos with tortillas, 80/20 ground meat. She will eat noodles for snacks or granola bars as time allows.    Has been told in the past that she has cysts on her ovaries but has not been worked up for PCOS.  A1c 5.0 and glucose WNL.  Hx hypoglycemia before 2nd pregnancy and then it resolved.  She drinks 2 40 oz bottles of water daily.  Has a small child so her sleep is limited and interrupted.      Bilateral carpal tunnel syndrome-  Does not use wrist splints.  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  She works in a  and is moving all day long.      Daily DURON's-  Pt has reported improvement in HA's since starting Imitrex.  Also taking Ibuprofen 400mg in between Imitrex.  Did not yet do her MRI.  Will reschedule.                 ROS:      Review of Systems   Constitutional: Negative.    HENT: Negative.     Eyes: Negative.    Respiratory: Negative.     Cardiovascular: Negative.    Gastrointestinal: Negative.    Genitourinary: Negative.    Musculoskeletal:  Positive for joint pain.   Skin: Negative.    Neurological: Negative.    Endo/Heme/Allergies: Negative.    Psychiatric/Behavioral: Negative.     All other systems reviewed and are negative.      12 point review of systems conducted, negative except as stated in the history of present illness. See HPI for details.    History:     Past Medical History:   Diagnosis Date    Depression     Restless leg     Term pregnancy, repeat 2024        Past Surgical History:   Procedure Laterality Date     SECTION       SECTION N/A 2024    Procedure:  SECTION;  Surgeon: Ian Tate Jr., MD;  [facility];  Service: OB/GYN;  Laterality: N/A;    CHOLECYSTECTOMY      PLACEMENT-STENT Left     left leg       No family history on file.     Social History     Tobacco Use    Smoking status: Never    Smokeless tobacco: Never   Substance and Sexual Activity    Alcohol use: Not Currently    Drug use: Never    Sexual activity: Not Currently       Current Outpatient Medications   Medication Instructions    citalopram (CELEXA) 20 mg, Oral, Daily    gabapentin (NEURONTIN) 300 mg, Oral, Daily    ibuprofen (ADVIL,MOTRIN) 800 mg, Oral, 3 times daily    magnesium oxide (MAG-OX) 400 mg, Oral, Daily    sumatriptan (IMITREX) 50 MG tablet Take 1 tablet by mouth at onset of HA. May repeat in 2 hours if no improvement in HA.  Do not exceed 200 mg in 24 hours.        Review of patient's allergies indicates:  "  Allergen Reactions    Carrot Swelling       Objective:     Visit Vitals  /80 (BP Location: Left arm, Patient Position: Sitting)   Pulse 73   Temp 98.3 °F (36.8 °C) (Oral)   Resp 18   Ht 5' 7" (1.702 m)   Wt 112.9 kg (249 lb)   LMP 03/01/2025 (Approximate)   SpO2 98%   BMI 39.00 kg/m²       Physical Examination:     Physical Exam  Vitals reviewed.   Constitutional:       Appearance: Normal appearance. She is normal weight.   HENT:      Head: Normocephalic.   Cardiovascular:      Rate and Rhythm: Normal rate and regular rhythm.      Pulses: Normal pulses.      Heart sounds: Normal heart sounds.   Pulmonary:      Effort: Pulmonary effort is normal.      Breath sounds: Normal breath sounds.   Abdominal:      General: Abdomen is flat.      Palpations: Abdomen is soft.   Musculoskeletal:         General: Normal range of motion.      Cervical back: Normal range of motion.   Skin:     General: Skin is warm and dry.   Neurological:      Mental Status: She is alert.   Psychiatric:         Mood and Affect: Mood normal.         Lab Results:     Chemistry:  Lab Results   Component Value Date     01/14/2025    K 4.2 01/14/2025    BUN 12.5 01/14/2025    CREATININE 0.62 01/14/2025    EGFRNORACEVR >60 01/14/2025    GLUCOSE 92 01/14/2025    CALCIUM 9.4 01/14/2025    ALKPHOS 61 01/14/2025    LABPROT 7.8 01/14/2025    ALBUMIN 4.0 01/14/2025    BILIDIR 0.2 02/22/2022    IBILI 0.40 02/22/2022    AST 21 01/14/2025    ALT 32 01/14/2025    MG 1.80 09/03/2024    [id number] 16 (L) 01/14/2025    TSH 0.429 01/14/2025    [id number] 0.87 01/14/2025        Lab Results   Component Value Date    HGBA1C 5.0 01/14/2025        Hematology:  Lab Results   Component Value Date    WBC 6.50 01/14/2025    HGB 12.7 01/14/2025    HCT 39.5 01/14/2025     01/14/2025       Lipid Panel:  Lab Results   Component Value Date    CHOL 181 01/14/2025    HDL 40 01/14/2025    LDL 80.00 01/14/2025    TRIG 304 (H) 01/14/2025    TOTALCHOLEST 5 " 01/14/2025        Urine:  Lab Results   Component Value Date    APPEARANCEUA Clear 01/14/2025    SGUA 1.029 01/14/2025    PROTEINUA Negative 01/14/2025    KETONESUA Negative 01/14/2025    LEUKOCYTESUR 25 (A) 01/14/2025    RBCUA 0-5 01/14/2025    WBCUA 0-5 01/14/2025    BACTERIA None Seen 01/14/2025    SQEPUA Occasional (A) 01/14/2025    HYALINECASTS None Seen 01/14/2025        Assessment:          ICD-10-CM ICD-9-CM   1. Positive GOPI (antinuclear antibody)  R76.8 795.79   2. Chronic pain of multiple joints  M25.50 719.49    G89.29 338.29          Plan:     1. Positive GOPI (antinuclear antibody)  -     Ambulatory referral/consult to Rheumatology; Future; Expected date: 03/26/2025    2. Chronic pain of multiple joints  Assessment & Plan:  Rheumatology workup with +GOPI  Referral to Rheumatology.     Orders:  -     Ambulatory referral/consult to Rheumatology; Future; Expected date: 03/26/2025           Follow up in about 4 months (around 7/19/2025) for joint pain, +GOPI, RLS, HA..  In addition to their scheduled follow up, the patient has also been instructed to follow up on as needed basis.       Future Appointments   Date Time Provider Department Center   7/23/2025  1:40 PM Cris Leigh FNP [facility]        JAYSON Neves

## 2025-05-19 DIAGNOSIS — S62.307A: Primary | ICD-10-CM

## 2025-05-27 ENCOUNTER — TELEPHONE (OUTPATIENT)
Dept: FAMILY MEDICINE | Facility: CLINIC | Age: 30
End: 2025-05-27
Payer: MEDICAID
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
## 2025-05-30 ENCOUNTER — HOSPITAL ENCOUNTER (OUTPATIENT)
Dept: RADIOLOGY | Facility: HOSPITAL | Age: 30
Discharge: HOME | End: 2025-05-30
Attending: ORTHOPAEDIC SURGERY
Payer: MEDICAID

## 2025-05-30 ENCOUNTER — OFFICE VISIT (OUTPATIENT)
Dept: ORTHOPEDICS | Facility: CLINIC | Age: 30
End: 2025-05-30
Payer: MEDICAID

## 2025-05-30 VITALS
HEART RATE: 69 BPM | OXYGEN SATURATION: 99 % | HEIGHT: 67 IN | SYSTOLIC BLOOD PRESSURE: 126 MMHG | DIASTOLIC BLOOD PRESSURE: 85 MMHG | BODY MASS INDEX: 40.28 KG/M2 | RESPIRATION RATE: 20 BRPM | WEIGHT: 256.63 LBS | TEMPERATURE: 98 F

## 2025-05-30 DIAGNOSIS — S63.92XA SPRAIN AND STRAIN OF LEFT HAND: ICD-10-CM

## 2025-05-30 DIAGNOSIS — S62.397A CLOSED NONDISPLACED FRACTURE OF OTHER PART OF FIFTH METACARPAL BONE OF LEFT HAND, INITIAL ENCOUNTER: Primary | ICD-10-CM

## 2025-05-30 DIAGNOSIS — M79.642 LEFT HAND PAIN: ICD-10-CM

## 2025-05-30 DIAGNOSIS — S66.912A SPRAIN AND STRAIN OF LEFT HAND: ICD-10-CM

## 2025-05-30 PROCEDURE — 99214 OFFICE O/P EST MOD 30 MIN: CPT | Mod: PBBFAC,25

## 2025-05-30 PROCEDURE — 73130 X-RAY EXAM OF HAND: CPT | Mod: TC,LT
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
## 2025-05-30 NOTE — PATIENT INSTRUCTIONS
Wear brace at all times except to shower.  Can work range of motion in the shower.  Do not use left hand for lifting at all.

## 2025-07-09 ENCOUNTER — PATIENT MESSAGE (OUTPATIENT)
Facility: CLINIC | Age: 30
End: 2025-07-09
Payer: MEDICAID

## 2025-07-21 ENCOUNTER — TELEPHONE (OUTPATIENT)
Dept: FAMILY MEDICINE | Facility: CLINIC | Age: 30
End: 2025-07-21
Payer: MEDICAID

## 2025-08-05 ENCOUNTER — TELEPHONE (OUTPATIENT)
Dept: FAMILY MEDICINE | Facility: CLINIC | Age: 30
End: 2025-08-05
Payer: MEDICAID

## 2025-08-07 ENCOUNTER — OFFICE VISIT (OUTPATIENT)
Dept: FAMILY MEDICINE | Facility: CLINIC | Age: 30
End: 2025-08-07
Payer: MEDICAID

## 2025-08-07 VITALS
RESPIRATION RATE: 18 BRPM | OXYGEN SATURATION: 100 % | SYSTOLIC BLOOD PRESSURE: 119 MMHG | WEIGHT: 263 LBS | DIASTOLIC BLOOD PRESSURE: 74 MMHG | TEMPERATURE: 99 F | HEART RATE: 71 BPM | BODY MASS INDEX: 41.28 KG/M2 | HEIGHT: 67 IN

## 2025-08-07 DIAGNOSIS — G44.52 NEW PERSISTENT DAILY HEADACHE: ICD-10-CM

## 2025-08-07 DIAGNOSIS — G56.03 BILATERAL CARPAL TUNNEL SYNDROME: ICD-10-CM

## 2025-08-07 DIAGNOSIS — F32.A ANXIETY AND DEPRESSION: ICD-10-CM

## 2025-08-07 DIAGNOSIS — R76.8 POSITIVE ANA (ANTINUCLEAR ANTIBODY): Primary | ICD-10-CM

## 2025-08-07 DIAGNOSIS — G25.81 RESTLESS LEG SYNDROME: ICD-10-CM

## 2025-08-07 DIAGNOSIS — F41.9 ANXIETY AND DEPRESSION: ICD-10-CM

## 2025-08-07 DIAGNOSIS — G89.29 CHRONIC PAIN OF MULTIPLE JOINTS: ICD-10-CM

## 2025-08-07 DIAGNOSIS — Z00.00 ENCOUNTER FOR WELLNESS EXAMINATION: ICD-10-CM

## 2025-08-07 DIAGNOSIS — M25.50 CHRONIC PAIN OF MULTIPLE JOINTS: ICD-10-CM

## 2025-08-07 PROCEDURE — 99214 OFFICE O/P EST MOD 30 MIN: CPT | Mod: PBBFAC,PN | Performed by: NURSE PRACTITIONER

## 2025-08-07 RX ORDER — IBUPROFEN 800 MG/1
800 TABLET, FILM COATED ORAL 3 TIMES DAILY
Qty: 90 TABLET | Refills: 3 | Status: SHIPPED | OUTPATIENT
Start: 2025-08-07

## 2025-08-07 RX ORDER — SUMATRIPTAN SUCCINATE 50 MG/1
TABLET ORAL
Qty: 20 TABLET | Refills: 5 | Status: SHIPPED | OUTPATIENT
Start: 2025-08-07

## 2025-08-07 RX ORDER — METHOCARBAMOL 750 MG/1
750 TABLET, FILM COATED ORAL 4 TIMES DAILY
Qty: 90 TABLET | Refills: 6 | Status: SHIPPED | OUTPATIENT
Start: 2025-08-07

## 2025-08-07 RX ORDER — DICLOFENAC SODIUM 10 MG/G
2 GEL TOPICAL 4 TIMES DAILY
Qty: 200 G | Refills: 11 | Status: SHIPPED | OUTPATIENT
Start: 2025-08-07

## 2025-08-07 RX ORDER — GABAPENTIN 300 MG/1
300 CAPSULE ORAL 3 TIMES DAILY
Qty: 90 CAPSULE | Refills: 6 | Status: SHIPPED | OUTPATIENT
Start: 2025-08-07

## 2025-08-07 NOTE — ASSESSMENT & PLAN NOTE
Celexa 20 mg po daily  Obtain counseling resources, list given for patient to find.    Practice deep breathing or abdominal breathing exercises when anxiety occurs.  Exercise daily. Get sunlight daily.  Avoid caffeine, alcohol and stimulants.  Practice positive phrases and repeat throughout the day, yoga, lavender scents or Chamomile tea will help anxiety.  Set healthy boundaries, avoid people and conversations that increase stress.  Reports any symptoms of suicidal or homicidal ideations immediately, if clinic is closed go to nearest emergency room.
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
## 2025-08-24 ENCOUNTER — HOSPITAL ENCOUNTER (EMERGENCY)
Facility: HOSPITAL | Age: 30
Discharge: HOME | End: 2025-08-24
Attending: FAMILY MEDICINE
Payer: MEDICAID

## 2025-08-24 VITALS
WEIGHT: 261 LBS | SYSTOLIC BLOOD PRESSURE: 125 MMHG | HEART RATE: 99 BPM | TEMPERATURE: 99 F | HEIGHT: 67 IN | OXYGEN SATURATION: 98 % | BODY MASS INDEX: 40.97 KG/M2 | RESPIRATION RATE: 18 BRPM | DIASTOLIC BLOOD PRESSURE: 65 MMHG

## 2025-08-24 DIAGNOSIS — J06.9 UPPER RESPIRATORY TRACT INFECTION, UNSPECIFIED TYPE: Primary | ICD-10-CM

## 2025-08-24 LAB
FLUAV AG UPPER RESP QL IA.RAPID: NOT DETECTED
FLUBV AG UPPER RESP QL IA.RAPID: NOT DETECTED
SARS-COV-2 RNA RESP QL NAA+PROBE: NOT DETECTED
STREP A PCR (OHS): NOT DETECTED

## 2025-08-24 PROCEDURE — 87651 STREP A DNA AMP PROBE: CPT | Performed by: FAMILY MEDICINE

## 2025-08-24 PROCEDURE — 99282 EMERGENCY DEPT VISIT SF MDM: CPT

## 2025-08-24 PROCEDURE — 87636 SARSCOV2 & INF A&B AMP PRB: CPT | Performed by: FAMILY MEDICINE

## (undated) DEVICE — SUT CHROMIC GUT 2-0 CT-1 27IN

## (undated) DEVICE — BULB SYRINGE EAR IRRIGATION

## (undated) DEVICE — BINDER ABDOM 4PANEL 12IN LG/XL

## (undated) DEVICE — SUT 3/0 36IN COATED VICRYL

## (undated) DEVICE — SUT 1 36IN CHROMIC GUT CTX

## (undated) DEVICE — SUT VICRYL 3-0 OB 36 CT-1

## (undated) DEVICE — SUT MONOCRYL 4-0 PS-1 UND

## (undated) DEVICE — PAD UNDERPAD 30X30

## (undated) DEVICE — ELECTRODE REM PLYHSV RETURN 9

## (undated) DEVICE — SOL NACL IRR 1000ML BTL

## (undated) DEVICE — CAP BABY BEANIE

## (undated) DEVICE — SET RANGER FLD WRM STD FLO

## (undated) DEVICE — SEE MEDLINE ITEM 156931

## (undated) DEVICE — STAPLER SKIN SUBCUTICULAR

## (undated) DEVICE — SOL WATER STRL IRR 1000ML

## (undated) DEVICE — PAD SANITARY OB STERILE

## (undated) DEVICE — SEE MEDLINE ITEM 157117

## (undated) DEVICE — SUT 2/0 27IN PLAIN GUT CT

## (undated) DEVICE — TRAY CATH FOL SIL URIMTR 16FR